# Patient Record
(demographics unavailable — no encounter records)

---

## 2024-11-28 NOTE — CARDIOLOGY SUMMARY
[de-identified] : (10/30/2024) EKG: NSR at 70 beats minute with a frontal QRS axis of +15 degrees. Inferior wall MI of indeterminate age, left IVCD  [de-identified] :  (7/16/2024) NUCLEAR STRESS TEST, 1. Myocardial Perfusion: Abnormal. 2. Stress electrocardiogram: No significant ischemic ST segment changes. 3. Qualitative Perfusion: - medium-sized, severe defect(s) in the inferolateral and lateral walls that are fixed, doesn't normalize with prone imaging suggestive of an infarct. 4. The post stress left ventricular EF is 70 %. 5. Akinesis of the inferolateral and lateral walls. [de-identified] : (ECHOCARDIOGRAPHIC CONCLUSIONS: (11/26//2024) ECHOCARDIOGRAPHIC CONCLUSIONS 1. Technically difficult image quality.   2. Left ventricular systolic function is normal with an ejection fraction visually estimated at 65 %. 3. Normal left ventricular diastolic function. 4. Mild aortic stenosis. Peak velocity 2.17 m/s, mean gradient 8.6 mmHg, VTI ratio 0.62, VICKI 1.82 cm by continuity equation. Gradients may be underestimated due to TDS. Trace aortic regurgitation. 5. Mild mitral valve stenosis. 6. There is calcification of the mitral valve annulus. 7. Moderate mitral regurgitation. Normal pulmonary venous flow. 8. No tricuspid regurgitation. Pulmonary artery systolic pressure could not be estimated. 9. No pericardial effusion seen. 10. Compared to the transthoracic echocardiogram performed on 9/22/2023,.    (9/24/2023) ECHOCARDIOGRAPHIC CONCLUSIONS:  1. Left ventricular systolic function is hyperdynamic with an ejection fraction of 57 % by Pressley's method of disks. 2. Basal inferior segment is abnormal. 3. There is moderate (grade 2) left ventricular diastolic dysfunction. 4. Right ventricular cavity is normal in size and probably normal systolic function. 5. There is moderate calcification of the mitral valve annulus. 6. There is mild mitral valve stenosis. 7. Mild to moderate mitral regurgitation. 8. The left atrium is mildly dilated in size. 9. Mild aortic stenosis. 10. No pericardial effusion seen. 11. Estimated pulmonary artery systolic pressure is 49 mmHg, consistent with mild to moderate pulmonary hypertension. 12. Mild pulmonic regurgitation. 13. Lipomatous interatrial septal hypertrophy present. 14. Echo-free space is noted in the liver consistent with cyst, however, dedicated imaging recommended for further evaluation if clinically indicated. 15. No prior echocardiogram is available for comparison. [de-identified] : (11/26/2024) ANKLE BRACHIAL INDEX CONCLUSIONS:  1. Right: Mildly abnormal TAYLOR 0.73 and ankle waveforms. Slight decrease post exercise. TBI is moderately reduced 0.36. 2. Left: Mildly abnormal TAYLOR 0.73 and ankle waveforms. Slight decrease post exercise. TBI is moderately reduced 0.43. 3. Compared to the TAYLOR report performed on 12/5/2023, decrease in left TAYLOR and bilateral TBI. 4. Consider lower arterial ultrasound for further characterization if clinically indicated.   (11/26/2024) CAROTID DUPLEX CONCLUSIONS:  1. Moderate heterogeneous and calcified non-obstructive atherosclerosis seen bilaterally. 2. Right mid ICA segment is very tortuous. 3. Left Vertebral Artery: antegrade flow. elevated PSV noted (133.40 cm/s). 4. Vertebral arteries: antegrade flow bilaterally. 5. Right: proximal ECA elevated velocity suggestive of narowing. 6. Compared to the carotid report performed on 9/22/2023, elevated left vert PSV, increased disease noted.

## 2024-11-28 NOTE — REASON FOR VISIT
[CV Risk Factors and Non-Cardiac Disease] : CV risk factors and non-cardiac disease [Structural Heart and Valve Disease] : structural heart and valve disease [Hyperlipidemia] : hyperlipidemia [Hypertension] : hypertension [Coronary Artery Disease] : coronary artery disease [FreeTextEntry1] : Patient is a 77-year-old white female with advanced ischemic and peripheral vascular disease who presents to the office today to review her duplex scan of her carotid arteries, and updated echocardiogram to evaluate LV function as well as to review arterial Dopplers of the lower extremity and TAYLOR index to evaluate the status of her advanced ischemic peripheral vas disease.  Patient was last here at the end of October for routine interval follow-up and will follow-up again in January for routine visit.  When she was here in July was 2 to review her nuclear stress test which was recommended at part of her routine follow-up.  She had presented in the summer  with a new complaint of difficulty breathing, she was here 2 months prior to that and notedi increasing fatigue and poor sleep patterns. Her Lexiscan stress test was without evidence of provokable ischemia with a preserved ejection fraction of 70% (see cardiology summary below)  Patient presented at the end of October depressed over her daughters situation as she has advanced liver disease but otherwise expresses no new or active cardiac concerns or complaints.  She notes she has been exercising a lot less but has no symptoms with routine activities, she is taking and tolerating all of her medications and otherwise has no other complaints at this time  At the time of her last evaluation for increased dyspnea a diuretic was implemented on alternate day, she was previously seen in the  office  to evaluate her response to the addition of diuretics based on elevated pulmonary pressures at the time of last echo.  Patient presented on that occasion noting that she was feeling significantly better with no new or active cardiac symptoms since initiating the extra diuretic..  She presents today with new complaints of more dyspnea with minimal exertion and is curtailed to her activity.  She presents today to review her noninvasive diagnostic studies feeling generally well with no new or active concerns or complaints of angina, claudication with exertion, PND orthopnea.

## 2024-11-28 NOTE — HISTORY OF PRESENT ILLNESS
[FreeTextEntry1] : Patient is a 77-year-old white female with advanced ASCVD, peripheral vascular disease and status post coronary bypass surgery and multiple interventions for revascularization who well-known to me since age 38 and was seen initially on August 3  in the office here to establish her ongoing cardiac care here and presents to the office several weeks ago for a brief follow-up, blood pressure check and as well as to review a battery of cardiac and vascular testing scheduled at the time of her last visit. Before today's testing she had a Lexiscan study performed August 29 that revealed a preserved ejection fraction and no evidence of active ischemia.  She underwent echocardiography on September 22 which is outlined below in cardiac summary and noted an elevated pulmonary pressure to 49 not previously recorded as that high.  Patient's prior cardiac history is notable for presenting at age 36 with new onset angina ,diagnostic cardiac cath was notable for left main disease and she underwent a quadruple bypass at age 38 with Dr. Justin Good at [facility].. She has been followed since that time with stress testing on a regular basis she unfortunately had a premorbid history of smoking 3 packs/day accounting for her aggressive vascular disease at a young age.. Patient has had other significant medical and cardiovascular issues she developed progressive claudication and underwent right popliteal stenosis repair with Dr. Corbett at [facility] for severe SFA stenosis. She has had vascular work done by Dr. Echols at [place] for recurrent and progressive symptomatic peripheral vascular disease. She is status post abdominal hernia repair with Dr. Webb within the last 2 years and is status post a right total hip replacement April 21 with Dr. Gordon Garces.   She is status post recent PTCA and stenting to circumflex lesion that was found on routine Lexiscan stress test in February 2022. At the time of that stress test there was mild reversible ischemia in a larger the circumflex and a right coronary with an EF of 43%. She underwent cardiac cath and was found to have circumflex disease and was stented at that time by Dr. Olman Wheatley..  When she presented here in August she was without any angina. She could and continues to walk 1 mile with 1 break. At a half a mile she notes no overt claudication but a heaviness in her legs that is new compared to a year ago. She is chronically on Trental and a host of other medications including aspirin nitrates beta-blockers antihypertensives as well as statins and Effient and aspirin..  At the time of her last visit she presented presents for her final study of arterial Dopplers of her lower extremities but ABIs were not able to be done.  They had been done subsequently and she presents today to review these in conjunction with her other studies as well as a brief follow-up to see her response to the addition of diuretics for elevated pulmonary pressures.  She seems to be doing well, denies PND orthopnea, trace lower extremity edema related to previous vascular surgery, no PND orthopnea and her exercise tolerance is slightly improved since being on the diuretic with less dyspnea noted.

## 2024-12-14 NOTE — HISTORY OF PRESENT ILLNESS
[FreeTextEntry1] : Patient is a 77-year-old white female with advanced ASCVD, peripheral vascular disease and status post coronary bypass surgery and multiple interventions for revascularization who well-known to me since age 38 and was seen initially on August 3 2023 in the office here to establish her ongoing cardiac care here and presents to the office several weeks ago for a brief follow-up, blood pressure check and as well as to review a battery of cardiac and vascular testing scheduled at the time of her last visit. Before today's testing she had a Lexiscan study performed August 29 that revealed a preserved ejection fraction and no evidence of active ischemia.  She underwent echocardiography on September 22 which is outlined below in cardiac summary and noted an elevated pulmonary pressure to 49 not previously recorded as that high.  Patient's prior cardiac history is notable for presenting at age 36 with new onset angina ,diagnostic cardiac cath was notable for left main disease and she underwent a quadruple bypass at age 38 with Dr. Justin Good at [facility].. She has been followed since that time with stress testing on a regular basis she unfortunately had a premorbid history of smoking 3 packs/day accounting for her aggressive vascular disease at a young age.. Patient has had other significant medical and cardiovascular issues she developed progressive claudication and underwent right popliteal stenosis repair with Dr. Corbett at [facility] for severe SFA stenosis. She has had vascular work done by Dr. Echols at [place] for recurrent and progressive symptomatic peripheral vascular disease. She is status post abdominal hernia repair with Dr. Webb within the last 2 years and is status post a right total hip replacement April 21 with Dr. Gordon Garces.   She is status post recent PTCA and stenting to circumflex lesion that was found on routine Lexiscan stress test in February 2022. At the time of that stress test there was mild reversible ischemia in a larger the circumflex and a right coronary with an EF of 43%. She underwent cardiac cath and was found to have circumflex disease and was stented at that time by Dr. Olman Wheatley..  When she presented here in August she was without any angina. She could and continues to walk 1 mile with 1 break. At a half a mile she notes no overt claudication but a heaviness in her legs that is new compared to a year ago. She is chronically on Trental and a host of other medications including aspirin nitrates beta-blockers antihypertensives as well as statins and Effient and aspirin..  At the time of her last visit she presented presents for her final study of arterial Dopplers of her lower extremities but ABIs were not able to be done.  They had been done subsequently and they were quite stable, she has no claudication with routine activities in addition she has had her echo updated which revealed stable LV function and mild valvular heart disease.  (See cardiology summary).  Patient had this recent episode of near syncope related to postural orthostatic changes and is aware that she is chronically dehydrated and that her current medication POTS or vascular response of this.  Her vital signs are ideal today at 132/60 and she was once again encouraged to hydrate no other changes were made to her medical regimen at this point in time other than limiting use of her diuretic and antiplatelet therapy has been DC'd until she has neurological clearance to reimplemented.  She will be seeing neurologist next week..

## 2024-12-14 NOTE — REVIEW OF SYSTEMS
[Dyspnea on exertion] : dyspnea during exertion [Dizziness] : dizziness [Negative] : Heme/Lymph [SOB] : no shortness of breath [Chest Discomfort] : no chest discomfort [Lower Ext Edema] : no extremity edema [Leg Claudication] : no intermittent leg claudication [Palpitations] : no palpitations [Orthopnea] : no orthopnea [PND] : no PND [Syncope] : no syncope [de-identified] : Near syncope

## 2024-12-14 NOTE — CARDIOLOGY SUMMARY
[de-identified] : (12/12/2024) EKG: NSR at 74 beats minute with a frontal QRS axis +15 degrees, inferior MI indeterminate age, nonspecific IVCD otherwise normal trace [de-identified] :  (7/16/2024) NUCLEAR STRESS TEST, 1. Myocardial Perfusion: Abnormal. 2. Stress electrocardiogram: No significant ischemic ST segment changes. 3. Qualitative Perfusion: - medium-sized, severe defect(s) in the inferolateral and lateral walls that are fixed, doesn't normalize with prone imaging suggestive of an infarct. 4. The post stress left ventricular EF is 70 %. 5. Akinesis of the inferolateral and lateral walls.  [de-identified] : (11/26//2024) ECHOCARDIOGRAPHIC CONCLUSIONS 1. Technically difficult image quality. 2. Left ventricular systolic function is normal with an ejection fraction visually estimated at 65 %. 3. Normal left ventricular diastolic function. 4. Mild aortic stenosis. Peak velocity 2.17 m/s, mean gradient 8.6 mmHg, VTI ratio 0.62, VICKI 1.82 cm by continuity equation. Gradients may be underestimated due to TDS. Trace aortic regurgitation. 5. Mild mitral valve stenosis. 6. There is calcification of the mitral valve annulus. 7. Moderate mitral regurgitation. Normal pulmonary venous flow. 8. No tricuspid regurgitation. Pulmonary artery systolic pressure could not be estimated. 9. No pericardial effusion seen. 10. Compared to the transthoracic echocardiogram performed on 9/22/2023,.    (9/24/2023) ECHOCARDIOGRAPHIC CONCLUSIONS:  1. Left ventricular systolic function is hyperdynamic with an ejection fraction of 57 % by Pressley's method of disks. 2. Basal inferior segment is abnormal. 3. There is moderate (grade 2) left ventricular diastolic dysfunction. 4. Right ventricular cavity is normal in size and probably normal systolic function. 5. There is moderate calcification of the mitral valve annulus. 6. There is mild mitral valve stenosis. 7. Mild to moderate mitral regurgitation. 8. The left atrium is mildly dilated in size. 9. Mild aortic stenosis. 10. No pericardial effusion seen. 11. Estimated pulmonary artery systolic pressure is 49 mmHg, consistent with mild to moderate pulmonary hypertension. 12. Mild pulmonic regurgitation. 13. Lipomatous interatrial septal hypertrophy present. 14. Echo-free space is noted in the liver consistent with cyst, however, dedicated imaging recommended for further evaluation if clinically indicated. 15. No prior echocardiogram is available for comparison.   [de-identified] :   (11/26/2024) CAROTID DUPLEX CONCLUSIONS:  1. Moderate heterogeneous and calcified non-obstructive atherosclerosis seen bilaterally. 2. Right mid ICA segment is very tortuous. 3. Left Vertebral Artery: antegrade flow. elevated PSV noted (133.40 cm/s). 4. Vertebral arteries: antegrade flow bilaterally. 5. Right: proximal ECA elevated velocity suggestive of narowing. 6. Compared to the carotid report performed on 9/22/2023, elevated left vert PSV, increased disease noted.   (11/26/2024) ANKLE BRACHIAL INDEX CONCLUSIONS:  1. Right: Mildly abnormal TAYLOR 0.73 and ankle waveforms. Slight decrease post exercise. TBI is moderately reduced 0.36. 2. Left: Mildly abnormal TAYLOR 0.73 and ankle waveforms. Slight decrease post exercise. TBI is moderately reduced 0.43. 3. Compared to the TAYLOR report performed on 12/5/2023, decrease in left TAYLOR and bilateral TBI. 4. Consider lower arterial ultrasound for further characterization if clinically indicated.

## 2024-12-14 NOTE — REVIEW OF SYSTEMS
[Dyspnea on exertion] : dyspnea during exertion [Dizziness] : dizziness [Negative] : Heme/Lymph [SOB] : no shortness of breath [Chest Discomfort] : no chest discomfort [Lower Ext Edema] : no extremity edema [Leg Claudication] : no intermittent leg claudication [Palpitations] : no palpitations [Orthopnea] : no orthopnea [PND] : no PND [Syncope] : no syncope [de-identified] : Near syncope

## 2024-12-14 NOTE — CARDIOLOGY SUMMARY
[de-identified] : (12/12/2024) EKG: NSR at 74 beats minute with a frontal QRS axis +15 degrees, inferior MI indeterminate age, nonspecific IVCD otherwise normal trace [de-identified] :  (7/16/2024) NUCLEAR STRESS TEST, 1. Myocardial Perfusion: Abnormal. 2. Stress electrocardiogram: No significant ischemic ST segment changes. 3. Qualitative Perfusion: - medium-sized, severe defect(s) in the inferolateral and lateral walls that are fixed, doesn't normalize with prone imaging suggestive of an infarct. 4. The post stress left ventricular EF is 70 %. 5. Akinesis of the inferolateral and lateral walls.  [de-identified] : (11/26//2024) ECHOCARDIOGRAPHIC CONCLUSIONS 1. Technically difficult image quality. 2. Left ventricular systolic function is normal with an ejection fraction visually estimated at 65 %. 3. Normal left ventricular diastolic function. 4. Mild aortic stenosis. Peak velocity 2.17 m/s, mean gradient 8.6 mmHg, VTI ratio 0.62, VICKI 1.82 cm by continuity equation. Gradients may be underestimated due to TDS. Trace aortic regurgitation. 5. Mild mitral valve stenosis. 6. There is calcification of the mitral valve annulus. 7. Moderate mitral regurgitation. Normal pulmonary venous flow. 8. No tricuspid regurgitation. Pulmonary artery systolic pressure could not be estimated. 9. No pericardial effusion seen. 10. Compared to the transthoracic echocardiogram performed on 9/22/2023,.    (9/24/2023) ECHOCARDIOGRAPHIC CONCLUSIONS:  1. Left ventricular systolic function is hyperdynamic with an ejection fraction of 57 % by Pressley's method of disks. 2. Basal inferior segment is abnormal. 3. There is moderate (grade 2) left ventricular diastolic dysfunction. 4. Right ventricular cavity is normal in size and probably normal systolic function. 5. There is moderate calcification of the mitral valve annulus. 6. There is mild mitral valve stenosis. 7. Mild to moderate mitral regurgitation. 8. The left atrium is mildly dilated in size. 9. Mild aortic stenosis. 10. No pericardial effusion seen. 11. Estimated pulmonary artery systolic pressure is 49 mmHg, consistent with mild to moderate pulmonary hypertension. 12. Mild pulmonic regurgitation. 13. Lipomatous interatrial septal hypertrophy present. 14. Echo-free space is noted in the liver consistent with cyst, however, dedicated imaging recommended for further evaluation if clinically indicated. 15. No prior echocardiogram is available for comparison.   [de-identified] :   (11/26/2024) CAROTID DUPLEX CONCLUSIONS:  1. Moderate heterogeneous and calcified non-obstructive atherosclerosis seen bilaterally. 2. Right mid ICA segment is very tortuous. 3. Left Vertebral Artery: antegrade flow. elevated PSV noted (133.40 cm/s). 4. Vertebral arteries: antegrade flow bilaterally. 5. Right: proximal ECA elevated velocity suggestive of narowing. 6. Compared to the carotid report performed on 9/22/2023, elevated left vert PSV, increased disease noted.   (11/26/2024) ANKLE BRACHIAL INDEX CONCLUSIONS:  1. Right: Mildly abnormal TAYLOR 0.73 and ankle waveforms. Slight decrease post exercise. TBI is moderately reduced 0.36. 2. Left: Mildly abnormal TAYLOR 0.73 and ankle waveforms. Slight decrease post exercise. TBI is moderately reduced 0.43. 3. Compared to the TAYLOR report performed on 12/5/2023, decrease in left TAYLOR and bilateral TBI. 4. Consider lower arterial ultrasound for further characterization if clinically indicated.

## 2024-12-14 NOTE — REASON FOR VISIT
[CV Risk Factors and Non-Cardiac Disease] : CV risk factors and non-cardiac disease [Structural Heart and Valve Disease] : structural heart and valve disease [Hyperlipidemia] : hyperlipidemia [Hypertension] : hypertension [Coronary Artery Disease] : coronary artery disease [Spouse] : spouse [FreeTextEntry1] : Patient is a 77-year-old white female with advanced ischemic heart and peripheral vascular disease who presents to the office today after a brief hospitalization for syncope related to postural changes after 2 alcoholic beverages.  Patient previously had been seen in the office just recently on November 27 to review her duplex scan of her carotid arteries, and updated echocardiogram to evaluate LV function as well as to review arterial Dopplers of the lower extremity and TAYLOR index to evaluate the status of her advanced ischemic peripheral vas disease..Prior to her testing today she was seen here at the end of October for routine interval follow-up and will follow-up again in January for routine visit.  When she was here in July was 2 to review her nuclear stress test which was recommended at part of her routine follow-up.  She had presented in the summer  with a new complaint of difficulty breathing, she was here 2 months prior to that and notedi increasing fatigue and poor sleep patterns. Her Lexiscan stress test was without evidence of provokable ischemia with a preserved ejection fraction of 70% (see cardiology summary below)  Patient presented at the end of October depressed over her daughters situation as she has advanced liver disease but otherwise expresses no new or active cardiac concerns or complaints.  She notes she has been exercising a lot less but has no symptoms with routine activities, she is taking and tolerating all of her medications and otherwise has no other complaints at this time  At the time of her last evaluation for increased dyspnea a diuretic was implemented on alternate day, she was previously seen in the  office  to evaluate her response to the addition of diuretics based on elevated pulmonary pressures at the time of last echo.  Patient presented on that occasion noting that she was feeling significantly better with no new or active cardiac symptoms since initiating the extra diuretic..  She presents today with 2 episodes of near syncope the initial 1 associated with getting up quickly after having had 2 alcoholic beverages.  She did hit her head and had a very mild traumatic subdural.  She was taken to [facility] antiplatelet therapy was held and she presents today for reevaluation with no ongoing headache or other neurological complaints.  She admits she is not hydrating well, her BUN most recent lab work from December 10 was notable for a BUN of 9.9 and a creatinine of 0.49.  proBNP was 647 and she admits to not drinking well.  She no longer has symptoms of dyspnea with minimal exertion and hoping to become more active.

## 2024-12-14 NOTE — HISTORY OF PRESENT ILLNESS
[FreeTextEntry1] : Patient is a 77-year-old white female with advanced ASCVD, peripheral vascular disease and status post coronary bypass surgery and multiple interventions for revascularization who well-known to me since age 38 and was seen initially on August 3 2023 in the office here to establish her ongoing cardiac care here and presents to the office several weeks ago for a brief follow-up, blood pressure check and as well as to review a battery of cardiac and vascular testing scheduled at the time of her last visit. Before today's testing she had a Lexiscan study performed August 29 that revealed a preserved ejection fraction and no evidence of active ischemia.  She underwent echocardiography on September 22 which is outlined below in cardiac summary and noted an elevated pulmonary pressure to 49 not previously recorded as that high.  Patient's prior cardiac history is notable for presenting at age 36 with new onset angina ,diagnostic cardiac cath was notable for left main disease and she underwent a quadruple bypass at age 38 with Dr. Justin Good at [facility].. She has been followed since that time with stress testing on a regular basis she unfortunately had a premorbid history of smoking 3 packs/day accounting for her aggressive vascular disease at a young age.. Patient has had other significant medical and cardiovascular issues she developed progressive claudication and underwent right popliteal stenosis repair with Dr. Corbett at [facility] for severe SFA stenosis. She has had vascular work done by Dr. Echols at [place] for recurrent and progressive symptomatic peripheral vascular disease. She is status post abdominal hernia repair with Dr. Webb within the last 2 years and is status post a right total hip replacement April 21 with Dr. Gordon Garces.   She is status post recent PTCA and stenting to circumflex lesion that was found on routine Lexiscan stress test in February 2022. At the time of that stress test there was mild reversible ischemia in a larger the circumflex and a right coronary with an EF of 43%. She underwent cardiac cath and was found to have circumflex disease and was stented at that time by Dr. Olman Wheatley..  When she presented here in August she was without any angina. She could and continues to walk 1 mile with 1 break. At a half a mile she notes no overt claudication but a heaviness in her legs that is new compared to a year ago. She is chronically on Trental and a host of other medications including aspirin nitrates beta-blockers antihypertensives as well as statins and Effient and aspirin..  At the time of her last visit she presented presents for her final study of arterial Dopplers of her lower extremities but ABIs were not able to be done.  They had been done subsequently and they were quite stable, she has no claudication with routine activities in addition she has had her echo updated which revealed stable LV function and mild valvular heart disease.  (See cardiology summary).  Patient had this recent episode of near syncope related to postural orthostatic changes and is aware that she is chronically dehydrated and that her current medication POTS or vascular response of this.  Her vital signs are ideal today at 132/60 and she was once again encouraged to hydrate no other changes were made to her medical regimen at this point in time other than limiting use of her diuretic and antiplatelet therapy has been DC'd until she has neurological clearance to reimplemented.  She will be seeing neurologist next week..

## 2024-12-14 NOTE — PHYSICAL EXAM
[Normal] : normal conjunctiva [Carotid Bruit] : carotid bruit [Normal S1, S2] : normal S1, S2 [No Gallop] : no gallop [Murmur] : murmur [Diminished Pedal Pulses ___] : diminished pedal pulses [unfilled] [de-identified] : Bilateral right greater than left [de-identified] : Grade 1/6 to 2/6 apical MR murmur

## 2024-12-14 NOTE — DISCUSSION/SUMMARY
[EKG obtained to assist in diagnosis and management of assessed problem(s)] : EKG obtained to assist in diagnosis and management of assessed problem(s) [FreeTextEntry1] : Patient is a 77-year-old white female who presents the office today for evaluation after 2 near syncopal episodes associated with orthostatic changes, no prodrome, no loss of consciousness but wound up in [facility] ER with a traumatic subdural and antiplatelet agents had to be discontinued.  Patient is well-known to me has  advanced ischemic and peripheral vascular disease,, status post quadruple bypass at age 38 status post multiple interventions last being in 2022 to a  circumflex lesion who presents to the office today review multiple vascular studies and she is now on mild aortic stenosis which has remained mild and her ejection fraction is preserved at 65%.  She has had previous carotid artery disease that also looks stable on recent study and her lower extremity Dopplers with TAYLOR indexes reveal quite satisfactory and disease indices of 0.73 bilaterally with only a distal toe being potentially Rise but she is relatively asymptomatic.  Patient was reassured regarding her vascular studies at the time of last visit.  She has preserved ejection fraction, her valvular heart disease is stable and her vascular disease has not progressed either objectively or clinically.  With regard to issues that led up to her recent admission.  She was cautioned to continue to hydrate well, judiciously use her diuretic as her proBNP is minimally elevated and it is warranted she is less dyspneic on it but needs to hydrate better otherwise no changes were made to her medical regimen at this point in time.  She was advised to return to the office in approximately 6 to 8 weeks for her routine interval follow-up.  (See all studies in cardiology summary)  Joel Goldberg, MD, FACC

## 2024-12-14 NOTE — PHYSICAL EXAM
[Normal] : normal conjunctiva [Carotid Bruit] : carotid bruit [Normal S1, S2] : normal S1, S2 [No Gallop] : no gallop [Murmur] : murmur [Diminished Pedal Pulses ___] : diminished pedal pulses [unfilled] [de-identified] : Bilateral right greater than left [de-identified] : Grade 1/6 to 2/6 apical MR murmur

## 2024-12-14 NOTE — DISCUSSION/SUMMARY
[EKG obtained to assist in diagnosis and management of assessed problem(s)] : EKG obtained to assist in diagnosis and management of assessed problem(s) [FreeTextEntry1] : Patient is a 77-year-old white female who presents the office today for evaluation after 2 near syncopal episodes associated with orthostatic changes, no prodrome, no loss of consciousness but wound up in [facility] ER with a traumatic subdural and antiplatelet agents had to be discontinued.  Patient is well-known to me has  advanced ischemic and peripheral vascular disease,, status post quadruple bypass at age 38 status post multiple interventions last being in 2022 to a  circumflex lesion who presents to the office today review multiple vascular studies and she is now on mild aortic stenosis which has remained mild and her ejection fraction is preserved at 65%.  She has had previous carotid artery disease that also looks stable on recent study and her lower extremity Dopplers with TAYLOR indexes reveal quite satisfactory and disease indices of 0.73 bilaterally with only a distal toe being potentially Rise but she is relatively asymptomatic.  Patient was reassured regarding her vascular studies at the time of last visit.  She has preserved ejection fraction, her valvular heart disease is stable and her vascular disease has not progressed either objectively or clinically.  With regard to issues that led up to her recent admission.  She was cautioned to continue to hydrate well, judiciously use her diuretic as her proBNP is minimally elevated and it is warranted she is less dyspneic on it but needs to hydrate better otherwise no changes were made to her medical regimen at this point in time.  She was advised to return to the office in approximately 6 to 8 weeks for her routine interval follow-up.  (See all studies in cardiology summary)  Joel Goldberg, MD, FACC

## 2024-12-14 NOTE — REASON FOR VISIT
[CV Risk Factors and Non-Cardiac Disease] : CV risk factors and non-cardiac disease [Structural Heart and Valve Disease] : structural heart and valve disease [Hyperlipidemia] : hyperlipidemia [Hypertension] : hypertension [Coronary Artery Disease] : coronary artery disease [Spouse] : spouse [FreeTextEntry1] : Patient is a 77-year-old white female with advanced ischemic heart and peripheral vascular disease who presents to the office today after a brief hospitalization for syncope related to postural changes after 2 alcoholic beverages.  Patient previously had been seen in the office just recently on November 27 to review her duplex scan of her carotid arteries, and updated echocardiogram to evaluate LV function as well as to review arterial Dopplers of the lower extremity and TAYLOR index to evaluate the status of her advanced ischemic peripheral vas disease..Prior to her testing today she was seen here at the end of October for routine interval follow-up and will follow-up again in January for routine visit.  When she was here in July was 2 to review her nuclear stress test which was recommended at part of her routine follow-up.  She had presented in the summer  with a new complaint of difficulty breathing, she was here 2 months prior to that and notedi increasing fatigue and poor sleep patterns. Her Lexiscan stress test was without evidence of provokable ischemia with a preserved ejection fraction of 70% (see cardiology summary below)  Patient presented at the end of October depressed over her daughters situation as she has advanced liver disease but otherwise expresses no new or active cardiac concerns or complaints.  She notes she has been exercising a lot less but has no symptoms with routine activities, she is taking and tolerating all of her medications and otherwise has no other complaints at this time  At the time of her last evaluation for increased dyspnea a diuretic was implemented on alternate day, she was previously seen in the  office  to evaluate her response to the addition of diuretics based on elevated pulmonary pressures at the time of last echo.  Patient presented on that occasion noting that she was feeling significantly better with no new or active cardiac symptoms since initiating the extra diuretic..  She presents today with 2 episodes of near syncope the initial 1 associated with getting up quickly after having had 2 alcoholic beverages.  She did hit her head and had a very mild traumatic subdural.  She was taken to [facility] antiplatelet therapy was held and she presents today for reevaluation with no ongoing headache or other neurological complaints.  She admits she is not hydrating well, her BUN most recent lab work from December 10 was notable for a BUN of 9.9 and a creatinine of 0.49.  proBNP was 647 and she admits to not drinking well.  She no longer has symptoms of dyspnea with minimal exertion and hoping to become more active.

## 2024-12-17 NOTE — PHYSICAL EXAM
[Alert] : alert [Well Nourished] : well nourished [No Acute Distress] : no acute distress [Well Developed] : well developed [Normal Sclera/Conjunctiva] : normal sclera/conjunctiva [No Proptosis] : no proptosis [Thyroid Not Enlarged] : the thyroid was not enlarged [No Thyroid Nodules] : no palpable thyroid nodules [Clear to Auscultation] : lungs were clear to auscultation bilaterally [Normal S1, S2] : normal S1 and S2 [Normal Rate] : heart rate was normal [Regular Rhythm] : with a regular rhythm [Not Tender] : non-tender [Not Distended] : not distended [Soft] : abdomen soft [No Spinal Tenderness] : no spinal tenderness [Spine Straight] : spine straight [No Stigmata of Cushings Syndrome] : no stigmata of Cushings Syndrome [Normal Gait] : normal gait [No Tremors] : no tremors [Oriented x3] : oriented to person, place, and time [Normal Affect] : the affect was normal

## 2024-12-19 NOTE — HISTORY OF PRESENT ILLNESS
[Alendronate (Fosomax)] : Alendronate [Denosumab (Prolia)] : Denosumab [FreeTextEntry1] : Pt returns for a follow-up visit for osteoporosis. Pt has been on Prolia 09/2015.  Pt had a fall 12/05/24 and hit her head. Pt had a CT scan. Pt was hospitalized for a couple of days due to subdural hematoma. Pt had another fall after having a syncopal episode and went to [facility] where she had CT scans done.   Pt has been told of decreasing bone density for several years. She took Fosamax for a short time in the distant past approximately 15 years ago but did not tolerate it due to UGI sx. She got a wrist fx several years ago due to a minor fall. No unusual risk factors for osteoporosis. On Prolia since 09/2015. Tolerating well. BMD 09/2016 indicates stable osteopenia in the spine, improved osteopenia in hips, stable osteoporosis in prox. radius. BMD results reviewed w/pt. BMD 10/2018 indicated improved osteopenia in the spine, stable osteopenia in hips, improved osteoporosis in proximal radius. Hips is significantly improved vs. 2015.  BMD results reviewed w/pt. BMD 11/2020 appears increased in spine but I suspect this is artifact due to arthritis. Femoral neck bone density moderately increased. Proximal radius remains severely low but stable.  BMD results reviewed w/pt. BMD 11/2022 indicates normal spine although suspect some arthritis, stable osteopenia in total hip improved osteopenia in fem neck and stable osteoporosis in prox. radius.  BMD results reviewed w/pt. BMD 12/2024 indicates improved normal spine, stable osteopenia in hips, stable osteoporosis in prox. radius.  BMD results reviewed w/pt.    Hypothyroidism: clinically euthyroid on levothyroxine 75 mcg daily. No sx of hyper or hypothyroidism. No local neck pain. No dysphagia or dysphonia. No raciness, shakiness, heat/cold intolerance, tiredness, or fatigue. No palpitations, tremors, or sudden weight gain/loss.  Pt had a fall and had a left distal radius fracture. Pt had surgery in May 2023 and had a three- hole trimed volar plate implanted. Surgery was done at [facility]. Pt goes to physical therapy for balance and states it helps. Pt has a small vesicular rash on buttock for a week now. Went to a dermatologist and had a biopsy done. Results came back normal.    Previously had angina, several stents placed, R leg clot - had procedure [facility]. Pt still occ gets CP seemingly unrelated to what she is doing. H/o 2 vasc surgery procedures R femoral artery,claudication resolved.  Pt had R THR 4/2021, no complications. Had 20 lb weight loss s/p THR.

## 2024-12-19 NOTE — PROCEDURE
[FreeTextEntry1] :  Bone Mineral Density: 12/17/2024 Indication: Comparison to 2022, assess response to medication Spine: L1-3, -0.7, normal, +2.9%, +21% vs baseline  Total hip: -2.1, osteopenia, no significant change Femoral neck:  -1.7, osteopenia, no significant change Proximal radius: -3.7, osteoporosis, no significant change  Bone mineral density November 14, 2022 Compared to 2020 Spine excluding L4 -0.9 normal no significant change suspect some arthritis Total hip -2.2 osteopenia no significant change, +10.4% versus baseline 2015 Femoral neck -1.7 osteopenia +4.6%, +21.6% versus 2015 Proximal radius -3.7 osteoporosis no significant change  Bone mineral density November 17, 2020 indication: Compared to 2018 spine L1-3 -0.9 normal +13.2% suspect arthritic total hip -2.3 osteopenia no significant change femoral neck -2.0 osteopenia +5.5% proximal radius -3.8 osteoporosis no significant change  Bone mineral density: 10/16/2018  Indication: vs. 2016 Spine: -1.9 osteopenia (+4.6%) Total hip: -2.3 osteopenia, no significant change (+9.3% vs. 2015) Femoral neck: -2.3 osteopenia, no significant change (+10.2% vs. 2015) Proximal radius: -3.5 osteoporosis (+3.9%)  BMD 9/26/16 indication: assess response to Prolia Spine -2.3, osteopenia although some arthritis Total hip - 2.4, osteopenia +6.2% Femoral neck -2.3 osteopenia +8.5% Proximal radius -3.8, osteoporosis, no prior

## 2024-12-19 NOTE — ASSESSMENT
[FreeTextEntry1] : 76 y/o female returns for a follow-up visit for osteoporosis and hypothyroidism.   1. Postmenopausal osteoporosis: tried Fosamax in the distant past and did not tolerate due to UGI sx. Pt had a wrist fx no other osteoporosis related fx. On Prolia since 09/2015. Tolerating well. t. BMD 12/2024 indicates improved normal spine, stable osteopenia in hips, stable osteoporosis in prox. radius.  BMD results reviewed w/pt. Continue Prolia, buy and bill.  2. Hypothyroidism: clinically and chemically euthyroid on Synthroid 75 mcg daily. No local neck pain. No dysphagia or dysphonia. No raciness, shakiness, heat/cold intolerance, tiredness, or fatigue. No palpitations, tremors, or sudden weight gain/loss.  Labs: October 2024 Creatinine: 0.67 Calcium: 10.0 TSH: 1.51 Total T4: 11.7  F/u in 6 months.

## 2024-12-19 NOTE — END OF VISIT
[FreeTextEntry3] :  This note was written by Corine Wolf on (December 17, 2024) acting as a medical scribe for Dr. Katz This note was authored by the medical scribe for me. I have reviewed, edited, and revised the note as needed. I am in agreement with the exam findings, imaging findings, and treatment plan.  Cirilo Katz MD

## 2024-12-19 NOTE — HISTORY OF PRESENT ILLNESS
[Alendronate (Fosomax)] : Alendronate [Denosumab (Prolia)] : Denosumab [FreeTextEntry1] : Pt returns for a follow-up visit for osteoporosis. Pt has been on Prolia 09/2015.  Pt had a fall 12/05/24 and hit her head. Pt had a CT scan. Pt was hospitalized for a couple of days due to subdural hematoma. Pt had another fall after having a syncopal episode and went to [facility] where she had CT scans done.   Pt has been told of decreasing bone density for several years. She took Fosamax for a short time in the distant past approximately 15 years ago but did not tolerate it due to UGI sx. She got a wrist fx several years ago due to a minor fall. No unusual risk factors for osteoporosis. On Prolia since 09/2015. Tolerating well. BMD 09/2016 indicates stable osteopenia in the spine, improved osteopenia in hips, stable osteoporosis in prox. radius. BMD results reviewed w/pt. BMD 10/2018 indicated improved osteopenia in the spine, stable osteopenia in hips, improved osteoporosis in proximal radius. Hips is significantly improved vs. 2015.  BMD results reviewed w/pt. BMD 11/2020 appears increased in spine but I suspect this is artifact due to arthritis. Femoral neck bone density moderately increased. Proximal radius remains severely low but stable.  BMD results reviewed w/pt. BMD 11/2022 indicates normal spine although suspect some arthritis, stable osteopenia in total hip improved osteopenia in fem neck and stable osteoporosis in prox. radius.  BMD results reviewed w/pt. BMD 12/2024 indicates improved normal spine, stable osteopenia in hips, stable osteoporosis in prox. radius.  BMD results reviewed w/pt.    Hypothyroidism: clinically euthyroid on levothyroxine 75 mcg daily. No sx of hyper or hypothyroidism. No local neck pain. No dysphagia or dysphonia. No raciness, shakiness, heat/cold intolerance, tiredness, or fatigue. No palpitations, tremors, or sudden weight gain/loss.  Pt had a fall and had a left distal radius fracture. Pt had surgery in May 2023 and had a three- hole trimed volar plate implanted. Surgery was done at [facility]. Pt goes to physical therapy for balance and states it helps. Pt has a small vesicular rash on buttock for a week now. Went to a dermatologist and had a biopsy done. Results came back normal.    Previously had angina, several stents placed, R leg clot - had procedure [facility]. Pt still occ gets CP seemingly unrelated to what she is doing. H/o 2 vasc surgery procedures R femoral artery,claudication resolved.  Pt had R THR 4/2021, no complications. Had 20 lb weight loss s/p THR.

## 2024-12-19 NOTE — REVIEW OF SYSTEMS
[Fatigue] : no fatigue [Decreased Appetite] : appetite not decreased [Neck Pain] : no neck pain [Chest Pain] : no chest pain [Palpitations] : no palpitations [Shortness Of Breath] : no shortness of breath [Nausea] : no nausea [Abdominal Pain] : no abdominal pain [Vomiting] : no vomiting [Diarrhea] : no diarrhea [Polyuria] : no polyuria [Tremors] : no tremors [Cold Intolerance] : no cold intolerance [Heat Intolerance] : no heat intolerance [FreeTextEntry7] : Occasional constipation

## 2025-01-30 NOTE — REVIEW OF SYSTEMS
[Dyspnea on exertion] : dyspnea during exertion [Dizziness] : dizziness [Negative] : Heme/Lymph [SOB] : no shortness of breath [Chest Discomfort] : no chest discomfort [Lower Ext Edema] : no extremity edema [Leg Claudication] : no intermittent leg claudication [Palpitations] : no palpitations [Orthopnea] : no orthopnea [PND] : no PND [Syncope] : no syncope [de-identified] : Near syncope

## 2025-01-30 NOTE — REASON FOR VISIT
[CV Risk Factors and Non-Cardiac Disease] : CV risk factors and non-cardiac disease [Structural Heart and Valve Disease] : structural heart and valve disease [Hyperlipidemia] : hyperlipidemia [Hypertension] : hypertension [Coronary Artery Disease] : coronary artery disease [FreeTextEntry1] : Patient is a 77-year-old white female who presents the office today after having titrated upwards her beta-blocker and attempt to suppress episodic SVT seen on her last 3-day Zio patch  Patient is well-known to the practice, she has with advanced ischemic heart and peripheral vascular disease.  Patient previously had been seen in the office just recently on November 27 to review her duplex scan of her carotid arteries, and updated echocardiogram to evaluate LV function as well as to review arterial Dopplers of the lower extremity and TAYLOR index to evaluate the status of her advanced ischemic peripheral vas disease..  Prior to her testing today she was seen here at the end of October for routine interval follow-up and will follow-up again in January for routine visit.  When she was here in July was 2 to review her nuclear stress test which was recommended at part of her routine follow-up.  She had presented in the summer  with a new complaint of difficulty breathing, she was here 2 months prior to that and noted increasing fatigue and poor sleep patterns. Her Lexiscan stress test was without evidence of provokable ischemia with a preserved ejection fraction of 70% (see cardiology summary below)  Patient presented at the end of October depressed over her daughters situation as she has advanced liver disease but otherwise expresses no new or active cardiac concerns or complaints.  She notes she has been exercising a lot less but has no symptoms with routine activities, she is taking and tolerating all of her medications and otherwise has no other complaints at this time  At the time of her last evaluation for increased dyspnea a diuretic was implemented on alternate day, she was previously seen in the  office  to evaluate her response to the addition of diuretics based on elevated pulmonary pressures at the time of last echo.  Patient presented on that occasion noting that she was feeling significantly better with no new or active cardiac symptoms since initiating the extra diuretic..  She presents today with 2 episodes of near syncope the initial 1 associated with getting up quickly after having had 2 alcoholic beverages.  She did hit her head and had a very mild traumatic subdural.  She was taken to [facility] antiplatelet therapy was held and she presents today for reevaluation with no ongoing headache or other neurological complaints.  She admits she is not hydrating well, her BUN most recent lab work from December 10 was notable for a BUN of 9.9 and a creatinine of 0.49.  proBNP was 647 and she admits to not drinking well.  Patient had event recorder placed on December 12 and there were frequent short-lived episodes of SVT at that time her metoprolol was increased to 25 twice daily.  She presents today to evaluate her response to therapy.  She denies dizziness or lightheadedness does not perceive any more palpitations and feels generally well

## 2025-01-30 NOTE — HISTORY OF PRESENT ILLNESS
[FreeTextEntry1] : Patient is a 77-year-old white female with advanced ASCVD, peripheral vascular disease and status post coronary bypass surgery and multiple interventions for revascularization who well-known to me since age 38 and was seen initially on August 3  in the office here to establish her ongoing cardiac care here and presents to the office several weeks ago for a brief follow-up, blood pressure check and as well as to review a battery of cardiac and vascular testing scheduled at the time of her last visit. Before her recent testing she had a Lexiscan study performed August 29 that revealed a preserved ejection fraction and no evidence of active ischemia.  She underwent echocardiography on September 22 which is outlined below in cardiac summary and noted an elevated pulmonary pressure to 49 not previously recorded as that high.  Patient's prior cardiac history is notable for presenting at age 36 with new onset angina ,diagnostic cardiac cath was notable for left main disease and she underwent a quadruple bypass at age 38 with Dr. Justin Good at [facility].. She has been followed since that time with stress testing on a regular basis she unfortunately had a premorbid history of smoking 3 packs/day accounting for her aggressive vascular disease at a young age.. Patient has had other significant medical and cardiovascular issues she developed progressive claudication and underwent right popliteal stenosis repair with Dr. Corbett at [facility] for severe SFA stenosis. She has had vascular work done by Dr. Vazquez at [place] for recurrent and progressive symptomatic peripheral vascular disease. She is status post abdominal hernia repair with Dr. Webb within the last 2 years and is status post a right total hip replacement April 21 with Dr. Gordon Garces.   She is status post recent PTCA and stenting to circumflex lesion that was found on routine Lexiscan stress test in February 2022. At the time of that stress test there was mild reversible ischemia in a larger the circumflex and a right coronary with an EF of 43%. She underwent cardiac cath and was found to have circumflex disease and was stented at that time by Dr. Olman Wheatley..  When she presented here in August she was without any angina. She could and continues to walk 1 mile with 1 break. At a half a mile she notes no overt claudication but a heaviness in her legs that is new compared to a year ago. She is chronically on Trental and a host of other medications including aspirin nitrates beta-blockers antihypertensives as well as statins and Effient and aspirin..  At the time of her last visit she presented presents for her final study of arterial Dopplers of her lower extremities but ABIs were not able to be done.  They had been done subsequently and she presents today to review these in conjunction with her other studies as well as a brief follow-up to see her response to the addition of diuretics for elevated pulmonary pressures.  She seems to be doing well, denies PND orthopnea, trace lower extremity edema related to previous vascular surgery, no PND orthopnea and her exercise tolerance is slightly improved since being on the diuretic with less dyspnea noted.  She is taking and tolerating her increased dose of beta-blockers, rare lightheadedness mostly with postural changes otherwise has responded well to this with no described arrhythmias

## 2025-01-30 NOTE — CARDIOLOGY SUMMARY
[de-identified] : (12/12/2024) EKG: NSR at 74 beats minute with a frontal QRS axis +15 degrees, inferior MI indeterminate age, nonspecific IVCD otherwise normal trace  [de-identified] :  (7/16/2024) NUCLEAR STRESS TEST, 1. Myocardial Perfusion: Abnormal. 2. Stress electrocardiogram: No significant ischemic ST segment changes. 3. Qualitative Perfusion: - medium-sized, severe defect(s) in the inferolateral and lateral walls that are fixed, doesn't normalize with prone imaging suggestive of an infarct. 4. The post stress left ventricular EF is 70 %. 5. Akinesis of the inferolateral and lateral walls.    [de-identified] :  (11/26/2024) CAROTID DUPLEX CONCLUSIONS:  1. Moderate heterogeneous and calcified non-obstructive atherosclerosis seen bilaterally. 2. Right mid ICA segment is very tortuous. 3. Left Vertebral Artery: antegrade flow. elevated PSV noted (133.40 cm/s). 4. Vertebral arteries: antegrade flow bilaterally. 5. Right: proximal ECA elevated velocity suggestive of narowing. 6. Compared to the carotid report performed on 9/22/2023, elevated left vert PSV, increased disease noted.   (11/26/2024) ANKLE BRACHIAL INDEX CONCLUSIONS:  1. Right: Mildly abnormal TAYLOR 0.73 and ankle waveforms. Slight decrease post exercise. TBI is moderately reduced 0.36. 2. Left: Mildly abnormal TAYLOR 0.73 and ankle waveforms. Slight decrease post exercise. TBI is moderately reduced 0.43. 3. Compared to the TAYLOR report performed on 12/5/2023, decrease in left TAYLOR and bilateral TBI. 4. Consider lower arterial ultrasound for further characterization if clinically indicated. [de-identified] : (11/26//2024) ECHOCARDIOGRAPHIC CONCLUSIONS 1. Technically difficult image quality. 2. Left ventricular systolic function is normal with an ejection fraction visually estimated at 65 %. 3. Normal left ventricular diastolic function. 4. Mild aortic stenosis. Peak velocity 2.17 m/s, mean gradient 8.6 mmHg, VTI ratio 0.62, VICKI 1.82 cm by continuity equation. Gradients may be underestimated due to TDS. Trace aortic regurgitation. 5. Mild mitral valve stenosis. 6. There is calcification of the mitral valve annulus. 7. Moderate mitral regurgitation. Normal pulmonary venous flow. 8. No tricuspid regurgitation. Pulmonary artery systolic pressure could not be estimated. 9. No pericardial effusion seen. 10. Compared to the transthoracic echocardiogram performed on 9/22/2023,.    (9/24/2023) ECHOCARDIOGRAPHIC CONCLUSIONS:  1. Left ventricular systolic function is hyperdynamic with an ejection fraction of 57 % by Pressley's method of disks. 2. Basal inferior segment is abnormal. 3. There is moderate (grade 2) left ventricular diastolic dysfunction. 4. Right ventricular cavity is normal in size and probably normal systolic function. 5. There is moderate calcification of the mitral valve annulus. 6. There is mild mitral valve stenosis. 7. Mild to moderate mitral regurgitation. 8. The left atrium is mildly dilated in size. 9. Mild aortic stenosis. 10. No pericardial effusion seen. 11. Estimated pulmonary artery systolic pressure is 49 mmHg, consistent with mild to moderate pulmonary hypertension. 12. Mild pulmonic regurgitation. 13. Lipomatous interatrial septal hypertrophy present. 14. Echo-free space is noted in the liver consistent with cyst, however, dedicated imaging recommended for further evaluation if clinically indicated. 15. No prior echocardiogram is available for comparison.

## 2025-01-30 NOTE — DISCUSSION/SUMMARY
[FreeTextEntry1] : Patient is a 77-year-old white female with advanced ischemic and peripheral vascular disease,, status post quadruple bypass at age 38 status post multiple interventions last being in 2022 to a  circumflex lesion who presents to the office today review multiple vascular studies and she is now on mild aortic stenosis which has remained mild and her ejection fraction is preserved at 65%.  She recently had episodic SVT and in association with the recent fall and known ischemic heart disease her beta-blocker has been increased.  She seems to have tolerated it well without hemodynamic compromise and blood pressure today and exam was within normal limits.  Patient is up-to-date with previous carotid artery disease that also looks stable on recent study and her lower extremity Dopplers with TAYLOR indexes reveal quite satisfactory and disease indices of 0.73 bilaterally with only a distal toe being potentially Rise but she is relatively asymptomatic.  Patient was reassured regarding her vascular studies.  She has preserved ejection fraction, her valvular heart disease is stable and her vascular disease has not progressed either objectively or clinically.  No additional changes were made to her medical regimen.  She was advised to return to the office in approximately  8 weeks for her routine interval follow-up.  (See all studies in cardiology summary)  Joel Goldberg, MD, FACC     [EKG obtained to assist in diagnosis and management of assessed problem(s)] : EKG obtained to assist in diagnosis and management of assessed problem(s)

## 2025-01-30 NOTE — PHYSICAL EXAM
[Normal] : normal conjunctiva [Carotid Bruit] : carotid bruit [Normal S1, S2] : normal S1, S2 [No Gallop] : no gallop [Murmur] : murmur [Diminished Pedal Pulses ___] : diminished pedal pulses [unfilled] [de-identified] : Bilateral right greater than left [de-identified] : Grade 1/6 to 2/6 apical MR murmur

## 2025-03-10 NOTE — REASON FOR VISIT
[CV Risk Factors and Non-Cardiac Disease] : CV risk factors and non-cardiac disease [Structural Heart and Valve Disease] : structural heart and valve disease [Hyperlipidemia] : hyperlipidemia [Hypertension] : hypertension [Coronary Artery Disease] : coronary artery disease [Other: ____] : [unfilled] [FreeTextEntry1] : Patient is a 77-year-old white female with advanced ischemic and ischemic peripheral vas disease who presents the office today for routine interval follow-up.  Previously she was here after having supposedly titrated upwards her beta-blockerin an  attempt to suppress episodic SVT seen on her last 3-day Zio patch she was confused and did not actually implement this change yet she presents today fortunately with no new or active symptomatology.  Patient is well-known to the practice, she has with advanced ischemic heart and peripheral vascular disease.  Patient previously had been seen in the office on November 27 to review her duplex scan of her carotid arteries, and updated echocardiogram to evaluate LV function as well as to review arterial Dopplers of the lower extremity and TAYLOR index to evaluate the status of her advanced ischemic peripheral vas disease..  Prior to her vascular testing  she was seen here at the end of October for routine interval follow-up and  will follow-up again in January for routine visit.  When she was here in July was  to review her nuclear stress test which was recommended at part of her routine follow-up.  She had presented in the summer  with a new complaint of difficulty breathing, she was here 2 months prior to that and noted increasing fatigue and poor sleep patterns. Her Lexiscan stress test was without evidence of provokable ischemia with a preserved ejection fraction of 70% (see cardiology summary below)  Patient presented at the end of October depressed over her daughters situation as she has advanced liver disease but otherwise expresses no new or active cardiac concerns or complaints.  She notes she has been exercising a lot less but has no symptoms with routine activities, she is taking and tolerating all of her medications and otherwise has no other complaints at this time  At the time of her last evaluation for increased dyspnea a diuretic was implemented on alternate day, she was previously seen in the  office  to evaluate her response to the addition of diuretics based on elevated pulmonary pressures at the time of last echo.  Patient presented on that occasion noting that she was feeling significantly better with no new or active cardiac symptoms since initiating the extra diuretic..  She presents today with 2 episodes of near syncope the initial one associated with getting up quickly after having had 2 alcoholic beverages.  She did hit her head and had a very mild traumatic subdural.  She was taken to [facility] antiplatelet therapy was held and she presents today for reevaluation with no ongoing headache or other neurological complaints.  She admits she is not hydrating well, her BUN most recent lab work from December 10 was notable for a BUN of 9.9 and a creatinine of 0.49.  proBNP was 647 and she admits to not drinking well.  Patient had event recorder placed on December 12 and there were frequent short-lived episodes of SVT at that time her metoprolol was increased to 25 twice daily.  She presents today to evaluate her response to a change in therapy which ultimately was up to 37.5 twice a day which she did not implement correctly.  She presents today otherwise without any new or active concerns or complaints but does not understand the dose of metoprolol she is supposed to be on at this time.

## 2025-03-10 NOTE — REVIEW OF SYSTEMS
[Dyspnea on exertion] : dyspnea during exertion [Dizziness] : dizziness [Negative] : Heme/Lymph [SOB] : no shortness of breath [Chest Discomfort] : no chest discomfort [Lower Ext Edema] : no extremity edema [Leg Claudication] : no intermittent leg claudication [Palpitations] : no palpitations [Orthopnea] : no orthopnea [PND] : no PND [Syncope] : no syncope [de-identified] : Near syncope

## 2025-03-10 NOTE — REVIEW OF SYSTEMS
[Dyspnea on exertion] : dyspnea during exertion [Dizziness] : dizziness [Negative] : Heme/Lymph [SOB] : no shortness of breath [Chest Discomfort] : no chest discomfort [Lower Ext Edema] : no extremity edema [Leg Claudication] : no intermittent leg claudication [Palpitations] : no palpitations [Orthopnea] : no orthopnea [PND] : no PND [Syncope] : no syncope [de-identified] : Near syncope

## 2025-03-10 NOTE — CARDIOLOGY SUMMARY
[de-identified] : (12/12/2024) EKG: NSR at 74 beats minute with a frontal QRS axis +15 degrees, inferior MI indeterminate age, nonspecific IVCD otherwise normal trace  [de-identified] : (7/16/2024) NUCLEAR STRESS TEST, 1. Myocardial Perfusion: Abnormal. 2. Stress electrocardiogram: No significant ischemic ST segment changes. 3. Qualitative Perfusion: - medium-sized, severe defect(s) in the inferolateral and lateral walls that are fixed, doesn't normalize with prone imaging suggestive of an infarct. 4. The post stress left ventricular EF is 70 %. 5. Akinesis of the inferolateral and lateral walls.   [de-identified] :  (11/26//2024) ECHOCARDIOGRAPHIC CONCLUSIONS 1. Technically difficult image quality. 2. Left ventricular systolic function is normal with an ejection fraction visually estimated at 65 %. 3. Normal left ventricular diastolic function. 4. Mild aortic stenosis. Peak velocity 2.17 m/s, mean gradient 8.6 mmHg, VTI ratio 0.62, VICKI 1.82 cm by continuity equation. Gradients may be underestimated due to TDS. Trace aortic regurgitation. 5. Mild mitral valve stenosis. 6. There is calcification of the mitral valve annulus. 7. Moderate mitral regurgitation. Normal pulmonary venous flow. 8. No tricuspid regurgitation. Pulmonary artery systolic pressure could not be estimated. 9. No pericardial effusion seen. 10. Compared to the transthoracic echocardiogram performed on 9/22/2023,.  [de-identified] :  (11/26/2024) ANKLE BRACHIAL INDEX CONCLUSIONS:  1. Right: Mildly abnormal TAYLOR 0.73 and ankle waveforms. Slight decrease post exercise. TBI is moderately reduced 0.36. 2. Left: Mildly abnormal TAYLOR 0.73 and ankle waveforms. Slight decrease post exercise. TBI is moderately reduced 0.43. 3. Compared to the TAYLOR report performed on 12/5/2023, decrease in left TAYLOR and bilateral TBI. 4. Consider lower arterial ultrasound for further characterization if clinically indicated.   (11/26/2024) CAROTID DUPLEX CONCLUSIONS:  1. Moderate heterogeneous and calcified non-obstructive atherosclerosis seen bilaterally. 2. Right mid ICA segment is very tortuous. 3. Left Vertebral Artery: antegrade flow. elevated PSV noted (133.40 cm/s). 4. Vertebral arteries: antegrade flow bilaterally. 5. Right: proximal ECA elevated velocity suggestive of narowing. 6. Compared to the carotid report performed on 9/22/2023, elevated left vert PSV, increased disease noted.

## 2025-03-10 NOTE — CARDIOLOGY SUMMARY
[de-identified] : (12/12/2024) EKG: NSR at 74 beats minute with a frontal QRS axis +15 degrees, inferior MI indeterminate age, nonspecific IVCD otherwise normal trace  [de-identified] : (7/16/2024) NUCLEAR STRESS TEST, 1. Myocardial Perfusion: Abnormal. 2. Stress electrocardiogram: No significant ischemic ST segment changes. 3. Qualitative Perfusion: - medium-sized, severe defect(s) in the inferolateral and lateral walls that are fixed, doesn't normalize with prone imaging suggestive of an infarct. 4. The post stress left ventricular EF is 70 %. 5. Akinesis of the inferolateral and lateral walls.   [de-identified] :  (11/26//2024) ECHOCARDIOGRAPHIC CONCLUSIONS 1. Technically difficult image quality. 2. Left ventricular systolic function is normal with an ejection fraction visually estimated at 65 %. 3. Normal left ventricular diastolic function. 4. Mild aortic stenosis. Peak velocity 2.17 m/s, mean gradient 8.6 mmHg, VTI ratio 0.62, VICKI 1.82 cm by continuity equation. Gradients may be underestimated due to TDS. Trace aortic regurgitation. 5. Mild mitral valve stenosis. 6. There is calcification of the mitral valve annulus. 7. Moderate mitral regurgitation. Normal pulmonary venous flow. 8. No tricuspid regurgitation. Pulmonary artery systolic pressure could not be estimated. 9. No pericardial effusion seen. 10. Compared to the transthoracic echocardiogram performed on 9/22/2023,.  [de-identified] :  (11/26/2024) ANKLE BRACHIAL INDEX CONCLUSIONS:  1. Right: Mildly abnormal TAYLOR 0.73 and ankle waveforms. Slight decrease post exercise. TBI is moderately reduced 0.36. 2. Left: Mildly abnormal TAYLOR 0.73 and ankle waveforms. Slight decrease post exercise. TBI is moderately reduced 0.43. 3. Compared to the TAYLOR report performed on 12/5/2023, decrease in left TAYLOR and bilateral TBI. 4. Consider lower arterial ultrasound for further characterization if clinically indicated.   (11/26/2024) CAROTID DUPLEX CONCLUSIONS:  1. Moderate heterogeneous and calcified non-obstructive atherosclerosis seen bilaterally. 2. Right mid ICA segment is very tortuous. 3. Left Vertebral Artery: antegrade flow. elevated PSV noted (133.40 cm/s). 4. Vertebral arteries: antegrade flow bilaterally. 5. Right: proximal ECA elevated velocity suggestive of narowing. 6. Compared to the carotid report performed on 9/22/2023, elevated left vert PSV, increased disease noted.

## 2025-03-10 NOTE — DISCUSSION/SUMMARY
[EKG obtained to assist in diagnosis and management of assessed problem(s)] : EKG obtained to assist in diagnosis and management of assessed problem(s) [FreeTextEntry1] : Patient is a 77-year-old white female with advanced ischemic and peripheral vascular disease,, status post quadruple bypass at age 38 status post multiple interventions last being in 2022 to a  circumflex lesion who presents to the office today for routine interval follow-up and evaluation of what should have been an up titration of her beta-blockers to 37.5 twice daily and metoprolol for recurrent SVTs.  Patient appears to be well compensated today, has no active signs or symptoms of ischemic heart disease and/or angina, she has no evidence of heart failure and her lower extremity disease is not producing claudication with her usual exercise.  She recently underwent multiple vascular studies and has mild aortic stenosis which has remained mild and her ejection fraction is preserved at 65%.  She recently had episodic SVT and in association with the recent fall and known ischemic heart disease her beta-blocker has been increased.  She seems to have tolerated it well without hemodynamic compromise and blood pressure today and exam was within normal limits.  Patient is up-to-date with previous carotid artery disease that also looks stable on recent study and her lower extremity Dopplers with TAYLOR indexes reveal quite satisfactory and disease indices of 0.73 bilaterally with only a distal toe being potentially Rise but she is relatively asymptomatic.  Patient was reassured regarding her vascular studies.  She has preserved ejection fraction, her valvular heart disease is stable and her vascular disease has not progressed either objectively or clinically.  No additional changes were made to her medical regimen.  She was advised to return to the office in May for her routine interval follow-up.  (See all studies in cardiology summary)  Joel Goldberg, MD, FACC

## 2025-03-10 NOTE — HISTORY OF PRESENT ILLNESS
[FreeTextEntry1] : Patient is a 77-year-old white female with advanced ASCVD, peripheral vascular disease and status post coronary bypass surgery and multiple interventions for revascularization who well-known to me since age 38 and was seen initially on August 3 2023 in the office here to establish her ongoing cardiac care here and presents to the office several weeks ago for a brief follow-up, blood pressure check and as well as to review a battery of cardiac and vascular testing scheduled at the time of her last visit. Before today's testing she had a Lexiscan study performed August 29 that revealed a preserved ejection fraction and no evidence of active ischemia.  She underwent echocardiography on September 22 which is outlined below in cardiac summary and noted an elevated pulmonary pressure to 49 not previously recorded as that high.  Patient's prior cardiac history is notable for presenting at age 36 with new onset angina ,diagnostic cardiac cath was notable for left main disease and she underwent a quadruple bypass at age 38 with Dr. Justin Good at [facility].. She has been followed since that time with stress testing on a regular basis she unfortunately had a premorbid history of smoking 3 packs/day accounting for her aggressive vascular disease at a young age.. Patient has had other significant medical and cardiovascular issues she developed progressive claudication and underwent right popliteal stenosis repair with Dr. Corbett at [facility] for severe SFA stenosis. She has had vascular work done by Dr. Echols at [place] for recurrent and progressive symptomatic peripheral vascular disease. She is status post abdominal hernia repair with Dr. Webb within the last 2 years and is status post a right total hip replacement April 21 with Dr. Gordon Garces.   She is status post recent PTCA and stenting to circumflex lesion that was found on routine Lexiscan stress test in February 2022. At the time of that stress test there was mild reversible ischemia in a larger the circumflex and a right coronary with an EF of 43%. She underwent cardiac cath and was found to have circumflex disease and was stented at that time by Dr. Olman Wheatley..  When she presented here last August she was without any angina. She could and continues to walk 1 mile with 1 break. At a half a mile she notes no overt claudication but a heaviness in her legs that is new compared to a year ago. She is chronically on Trental and a host of other medications including aspirin nitrates beta-blockers antihypertensives as well as statins and Effient and aspirin..  At the time of her last visit she presented presents for her final study of arterial Dopplers of her lower extremities but ABIs were not able to be done.  They had been done subsequently and she presents today to review these in conjunction with her other studies as well as a brief follow-up to see her response to the addition of diuretics for elevated pulmonary pressures.  She seems to be doing well, denies PND orthopnea, trace lower extremity edema related to previous vascular surgery, no PND orthopnea and her exercise tolerance is slightly improved since being on the diuretic with less dyspnea noted.  A 3-day Zio patch at the end of December 2024 was notable for 4 SVT episodes to a rate of 125.  Patient was advised at that point increase her metoprolol to 37.5 twice daily which she now understands and will implement.  Patient is up-to-date on all vascular testing, nuclear stress testing and echocardiography.  (See cardiology summary below)

## 2025-03-10 NOTE — HISTORY OF PRESENT ILLNESS
[FreeTextEntry1] : Patient is a 77-year-old white female with advanced ASCVD, peripheral vascular disease and status post coronary bypass surgery and multiple interventions for revascularization who well-known to me since age 38 and was seen initially on August 3 2023 in the office here to establish her ongoing cardiac care here and presents to the office several weeks ago for a brief follow-up, blood pressure check and as well as to review a battery of cardiac and vascular testing scheduled at the time of her last visit. Before today's testing she had a Lexiscan study performed August 29 that revealed a preserved ejection fraction and no evidence of active ischemia.  She underwent echocardiography on September 22 which is outlined below in cardiac summary and noted an elevated pulmonary pressure to 49 not previously recorded as that high.  Patient's prior cardiac history is notable for presenting at age 36 with new onset angina ,diagnostic cardiac cath was notable for left main disease and she underwent a quadruple bypass at age 38 with Dr. Justin Good at [facility].. She has been followed since that time with stress testing on a regular basis she unfortunately had a premorbid history of smoking 3 packs/day accounting for her aggressive vascular disease at a young age.. Patient has had other significant medical and cardiovascular issues she developed progressive claudication and underwent right popliteal stenosis repair with Dr. Corbett at [facility] for severe SFA stenosis. She has had vascular work done by Dr. Echols at [place] for recurrent and progressive symptomatic peripheral vascular disease. She is status post abdominal hernia repair with Dr. Webb within the last 2 years and is status post a right total hip replacement April 21 with Dr. Gordon Garces.   She is status post recent PTCA and stenting to circumflex lesion that was found on routine Lexiscan stress test in February 2022. At the time of that stress test there was mild reversible ischemia in a larger the circumflex and a right coronary with an EF of 43%. She underwent cardiac cath and was found to have circumflex disease and was stented at that time by Dr. Olman Wheatley..  When she presented here last August she was without any angina. She could and continues to walk 1 mile with 1 break. At a half a mile she notes no overt claudication but a heaviness in her legs that is new compared to a year ago. She is chronically on Trental and a host of other medications including aspirin nitrates beta-blockers antihypertensives as well as statins and Effient and aspirin..  At the time of her last visit she presented presents for her final study of arterial Dopplers of her lower extremities but ABIs were not able to be done.  They had been done subsequently and she presents today to review these in conjunction with her other studies as well as a brief follow-up to see her response to the addition of diuretics for elevated pulmonary pressures.  She seems to be doing well, denies PND orthopnea, trace lower extremity edema related to previous vascular surgery, no PND orthopnea and her exercise tolerance is slightly improved since being on the diuretic with less dyspnea noted.  A 3-day Zio patch at the end of December 2024 was notable for 4 SVT episodes to a rate of 125.  Patient was advised at that point increase her metoprolol to 37.5 twice daily which she now understands and will implement.  Patient is up-to-date on all vascular testing, nuclear stress testing and echocardiography.  (See cardiology summary below)

## 2025-03-10 NOTE — PHYSICAL EXAM
[Normal] : normal conjunctiva [Carotid Bruit] : carotid bruit [Normal S1, S2] : normal S1, S2 [No Gallop] : no gallop [Murmur] : murmur [Diminished Pedal Pulses ___] : diminished pedal pulses [unfilled] [de-identified] : Bilateral right greater than left [de-identified] : Grade 1/6 to 2/6 apical MR murmur

## 2025-03-10 NOTE — PHYSICAL EXAM
[Normal] : normal conjunctiva [Carotid Bruit] : carotid bruit [Normal S1, S2] : normal S1, S2 [No Gallop] : no gallop [Murmur] : murmur [Diminished Pedal Pulses ___] : diminished pedal pulses [unfilled] [de-identified] : Bilateral right greater than left [de-identified] : Grade 1/6 to 2/6 apical MR murmur

## 2025-03-10 NOTE — REASON FOR VISIT
[CV Risk Factors and Non-Cardiac Disease] : CV risk factors and non-cardiac disease [Structural Heart and Valve Disease] : structural heart and valve disease [Hyperlipidemia] : hyperlipidemia [Hypertension] : hypertension [Coronary Artery Disease] : coronary artery disease [Other: ____] : [unfilled] [FreeTextEntry1] : Patient is a 77-year-old white female with advanced ischemic and ischemic peripheral vas disease who presents the office today for routine interval follow-up.  Previously she was here after having supposedly titrated upwards her beta-blockerin an  attempt to suppress episodic SVT seen on her last 3-day Zio patch she was confused and did not actually implement this change yet she presents today fortunately with no new or active symptomatology.  Patient is well-known to the practice, she has with advanced ischemic heart and peripheral vascular disease.  Patient previously had been seen in the office on November 27 to review her duplex scan of her carotid arteries, and updated echocardiogram to evaluate LV function as well as to review arterial Dopplers of the lower extremity and TAYLOR index to evaluate the status of her advanced ischemic peripheral vas disease..  Prior to her vascular testing  she was seen here at the end of October for routine interval follow-up and  will follow-up again in January for routine visit.  When she was here in July was  to review her nuclear stress test which was recommended at part of her routine follow-up.  She had presented in the summer  with a new complaint of difficulty breathing, she was here 2 months prior to that and noted increasing fatigue and poor sleep patterns. Her Lexiscan stress test was without evidence of provokable ischemia with a preserved ejection fraction of 70% (see cardiology summary below)  Patient presented at the end of October depressed over her daughters situation as she has advanced liver disease but otherwise expresses no new or active cardiac concerns or complaints.  She notes she has been exercising a lot less but has no symptoms with routine activities, she is taking and tolerating all of her medications and otherwise has no other complaints at this time  At the time of her last evaluation for increased dyspnea a diuretic was implemented on alternate day, she was previously seen in the  office  to evaluate her response to the addition of diuretics based on elevated pulmonary pressures at the time of last echo.  Patient presented on that occasion noting that she was feeling significantly better with no new or active cardiac symptoms since initiating the extra diuretic..  She presents today with 2 episodes of near syncope the initial one associated with getting up quickly after having had 2 alcoholic beverages.  She did hit her head and had a very mild traumatic subdural.  She was taken to [facility] antiplatelet therapy was held and she presents today for reevaluation with no ongoing headache or other neurological complaints.  She admits she is not hydrating well, her BUN most recent lab work from December 10 was notable for a BUN of 9.9 and a creatinine of 0.49.  proBNP was 647 and she admits to not drinking well.  Patient had event recorder placed on December 12 and there were frequent short-lived episodes of SVT at that time her metoprolol was increased to 25 twice daily.  She presents today to evaluate her response to a change in therapy which ultimately was up to 37.5 twice a day which she did not implement correctly.  She presents today otherwise without any new or active concerns or complaints but does not understand the dose of metoprolol she is supposed to be on at this time.

## 2025-04-29 NOTE — HISTORY OF PRESENT ILLNESS
[FreeTextEntry1] : Patient is a 77-year-old white female with advanced ASCVD, peripheral vascular disease and status post coronary bypass surgery and multiple interventions for revascularization who well-known to me since age 38 and was seen initially on August 3 2023 in the office here to establish her ongoing cardiac care here and presents to the office several weeks ago for a brief follow-up, blood pressure check and as well as to review a battery of cardiac and vascular testing scheduled at the time of her last visit. Before today's testing she had a Lexiscan study performed August 29 that revealed a preserved ejection fraction and no evidence of active ischemia.  She underwent echocardiography on September 22 which is outlined below in cardiac summary and noted an elevated pulmonary pressure to 49 not previously recorded as that high..  Diuretics were implemented, she presents today with lower extremity edema not taking diuretics for unclear reasons  Patient's prior cardiac history is notable for presenting at age 36 with new onset angina ,diagnostic cardiac cath was notable for left main disease and she underwent a quadruple bypass at age 38 with Dr. Justin Good at [facility].. She has been followed since that time with stress testing on a regular basis she unfortunately had a premorbid history of smoking 3 packs/day accounting for her aggressive vascular disease at a young age.. Patient has had other significant medical and cardiovascular issues she developed progressive claudication and underwent right popliteal stenosis repair with Dr. Corbett at [facility] for severe SFA stenosis. She has had vascular work done by Dr. Echols at [place] for recurrent and progressive symptomatic peripheral vascular disease. She is status post abdominal hernia repair with Dr. Webb within the last 2 years and is status post a right total hip replacement April 21 with Dr. Gordon Garces.   She is status post recent PTCA and stenting to circumflex lesion that was found on routine Lexiscan stress test in February 2022. At the time of that stress test there was mild reversible ischemia in a larger the circumflex and a right coronary with an EF of 43%. She underwent cardiac cath and was found to have circumflex disease and was stented at that time by Dr. Olman Wheatley..  When she presented here last August she was without any angina. She could and continues to walk 1 mile with 1 break. At a half a mile she notes no overt claudication but a heaviness in her legs that is new compared to a year ago. She is chronically on Trental and a host of other medications including aspirin nitrates beta-blockers antihypertensives as well as statins and Effient and aspirin..  At the time of her last visit she presented presents for her final study of arterial Dopplers of her lower extremities but ABIs were not able to be done.  They had been done subsequently and she presents today to review these in conjunction with her other studies as well as a brief follow-up to see her response to the addition of diuretics for elevated pulmonary pressures which for some reason were not [facility]med[facility] today.  She seems to be doing well, denies PND orthopnea, increased lower extremity edema related to previous vascular surgery and seemingly discontinued her diuretics at this time.  She has, no PND orthopnea   A 3-day Zio patch at the end of December 2024 was notable for 4 SVT episodes to a rate of 125.  Patient was advised at that point increase her metoprolol to 37.5 twice daily which she now understands and will implement.  Patient is up-to-date on all vascular testing, nuclear stress testing and echocardiography.  (See cardiology summary below)

## 2025-04-29 NOTE — PHYSICAL EXAM
[de-identified] : Bilateral right greater than left [de-identified] : Grade 1/6 to 2/6 apical MR murmur [de-identified] : 1-2+ left lower extremity trace right lower extremity edema

## 2025-04-29 NOTE — REVIEW OF SYSTEMS
[Weight Gain (___ Lbs)] : no recent weight gain [SOB] : no shortness of breath [Chest Discomfort] : no chest discomfort [Lower Ext Edema] : no extremity edema [Leg Claudication] : no intermittent leg claudication [Palpitations] : no palpitations [Orthopnea] : no orthopnea [PND] : no PND [Syncope] : no syncope [de-identified] : Near syncope

## 2025-04-29 NOTE — REASON FOR VISIT
[FreeTextEntry1] : Patient is a 77-year-old white female with advanced ischemic and ischemic peripheral vas disease who presents the office today with new complaints of increasing lower extremity edema without weight gain or increasing shortness of breath over the last several weeks.  Patient is well-known to this practice  she was last here after having supposedly titrated upwards her beta-blockerin an  attempt to suppress episodic SVT seen on her last 3-day Zio patch she was confused and did not actually implement this change yet she presents today fortunately with no new or active symptomatology.  She now is taking her metoprolol correctly at 37.5 mg twice daily and has no complaints of further arrhythmias.  On careful questioning the patient notes that the only new medication is minoxidil which she is now taking is a capsule form to prevent hair loss.  She already is on nifedipine and Isordil as she has no PND orthopnea and no change in renal function.  Patient is well-known to the practice, she has with advanced ischemic heart and peripheral vascular disease.  Patient previously had been seen in the office on November 27 to review her duplex scan of her carotid arteries, and updated echocardiogram to evaluate LV function as well as to review arterial Dopplers of the lower extremity and TAYLOR index to evaluate the status of her advanced ischemic peripheral vas disease..  Prior to her vascular testing  she was seen here at the end of October for routine interval follow-up and followed up again in January for routine visit.  When she was here last July it was  to review her nuclear stress test which was recommended at part of her routine follow-up.  She had presented in the summer  with a new complaint of difficulty breathing, she was here 2 months prior to that and noted increasing fatigue and poor sleep patterns. Her Lexiscan stress test was without evidence of provokable ischemia with a preserved ejection fraction of 70% (see cardiology summary below)  Patient presented at the end of October depressed over her daughters situation as she has advanced liver disease but otherwise expresses no new or active cardiac concerns or complaints.  She notes she has been exercising a lot less but has no symptoms with routine activities, she is taking and tolerating all of her medications and otherwise has no other complaints at this time  At the time of her last evaluation for increased dyspnea a diuretic was implemented on alternate day, she was previously seen in the  office  to evaluate her response to the addition of diuretics based on elevated pulmonary pressures at the time of last echo.  For some reason it appears that she is not taking this diuretic at this time which will be reimplemented on alternate day in view of her edema.    Patient last presented  noting that she was feeling significantly better with no new or active cardiac symptoms since initiating the extra diuretic..  She presents today with 2 episodes of near syncope the initial one associated with getting up quickly after having had 2 alcoholic beverages.  She did hit her head and had a very mild traumatic subdural.  She was taken to [facility] antiplatelet therapy was held and she presents today for reevaluation with no ongoing headache or other neurological complaints.  She admits she is not hydrating well, her BUN most recent lab work from December 10 was notable for a BUN of 9.9 and a creatinine of 0.49.  proBNP was 647 and she admits to not drinking well.  Patient had event recorder placed on December 12 and there were frequent short-lived episodes of SVT at that time her metoprolol was increased to 25 twice daily.  She presented after that episode to evaluate her response to a change in therapy which ultimately was up to 37.5 twice a day which she did not implement correctly.  She presents today otherwise without any new or active concerns other than increasing edema but appears that she is off her diuretic and has added minoxidil

## 2025-04-29 NOTE — DISCUSSION/SUMMARY
[FreeTextEntry1] : Patient is a 77-year-old white female with advanced ischemic and peripheral vascular disease,, status post quadruple bypass at age 38 status post multiple interventions last being in 2022 to a  circumflex lesion who presents to the office today with increasing edema, somehow off of her diuretic and had added minoxidil for hair loss to her regimen.  She has no signs or symptoms of heart failure, has not gained weight and has no PND orthopnea and at this point we will reimplement diuretics on an alternate day basis.  Regarding her other cardiac issues she is back on her beta-blockers at 37.5 twice daily and metoprolol for recurrent SVTs.  She has no complaints of ectopy today  Patient appears to be well compensated today, has no active signs or symptoms of ischemic heart disease and/or angina, she has no evidence of heart failure and her lower extremity disease is not producing claudication with her usual exercise.  Edema is likely multifactorial, elevated pulmonary pressures, minoxidil and her calcium channel blockers.  Hopefully the addition back of her diuretics will emanate the fluid.  She recently underwent multiple vascular studies and has mild aortic stenosis which has remained mild and her ejection fraction is preserved at 65%.  She recently had episodic SVT and in association with the recent fall and known ischemic heart disease her beta-blocker has been increased.  She seems to have tolerated it well without hemodynamic compromise and blood pressure today and exam was within normal limits.  Patient is up-to-date with previous carotid artery disease that also looks stable on recent study and her lower extremity Dopplers with TAYLOR indexes reveal quite satisfactory and disease indices of 0.73 bilaterally with only a distal toe being potentially Rise but she is relatively asymptomatic.  Patient was reassured regarding her vascular studies.  She has preserved ejection fraction, her valvular heart disease is stable and her vascular disease has not progressed either objectively or clinically.  She is advised to have a vascular consult Dr. Patricia when he next is in the [place] office so she has a vascular doctor here.  No additional changes were made to her medical regimen other than the addition of diuretics and alternate day.  She was advised to return to the office in May for her routine interval follow-up.  (See all studies in cardiology summary)  Joel Goldberg, MD, [facility]     [EKG obtained to assist in diagnosis and management of assessed problem(s)] : EKG obtained to assist in diagnosis and management of assessed problem(s)

## 2025-04-29 NOTE — CARDIOLOGY SUMMARY
[de-identified] : (4/29/2025) EKG: NSR at 73 bpm with a frontal QRS axis of of 30 degrees.  Inferior MI indeterminate age, decreased R wave across the precordial leads cannot rule out a MI of indeterminate age.  Nonspecific ST-T changes  (12/12/2024) EKG: NSR at 74 beats minute with a frontal QRS axis +15 degrees, inferior MI indeterminate age, nonspecific IVCD otherwise normal trace  [de-identified] : (7/16/2024) NUCLEAR STRESS TEST, 1. Myocardial Perfusion: Abnormal. 2. Stress electrocardiogram: No significant ischemic ST segment changes. 3. Qualitative Perfusion: - medium-sized, severe defect(s) in the inferolateral and lateral walls that are fixed, doesn't normalize with prone imaging suggestive of an infarct. 4. The post stress left ventricular EF is 70 %. 5. Akinesis of the inferolateral and lateral walls.   [de-identified] :  (11/26//2024) ECHOCARDIOGRAPHIC CONCLUSIONS 1. Technically difficult image quality. 2. Left ventricular systolic function is normal with an ejection fraction visually estimated at 65 %. 3. Normal left ventricular diastolic function. 4. Mild aortic stenosis. Peak velocity 2.17 m/s, mean gradient 8.6 mmHg, VTI ratio 0.62, VICKI 1.82 cm by continuity equation. Gradients may be underestimated due to TDS. Trace aortic regurgitation. 5. Mild mitral valve stenosis. 6. There is calcification of the mitral valve annulus. 7. Moderate mitral regurgitation. Normal pulmonary venous flow. 8. No tricuspid regurgitation. Pulmonary artery systolic pressure could not be estimated. 9. No pericardial effusion seen. 10. Compared to the transthoracic echocardiogram performed on 9/22/2023,.  [de-identified] :  (11/26/2024) ANKLE BRACHIAL INDEX CONCLUSIONS:  1. Right: Mildly abnormal TAYLOR 0.73 and ankle waveforms. Slight decrease post exercise. TBI is moderately reduced 0.36. 2. Left: Mildly abnormal TAYLOR 0.73 and ankle waveforms. Slight decrease post exercise. TBI is moderately reduced 0.43. 3. Compared to the TAYLOR report performed on 12/5/2023, decrease in left TAYLOR and bilateral TBI. 4. Consider lower arterial ultrasound for further characterization if clinically indicated.   (11/26/2024) CAROTID DUPLEX CONCLUSIONS:  1. Moderate heterogeneous and calcified non-obstructive atherosclerosis seen bilaterally. 2. Right mid ICA segment is very tortuous. 3. Left Vertebral Artery: antegrade flow. elevated PSV noted (133.40 cm/s). 4. Vertebral arteries: antegrade flow bilaterally. 5. Right: proximal ECA elevated velocity suggestive of narowing. 6. Compared to the carotid report performed on 9/22/2023, elevated left vert PSV, increased disease noted.

## 2025-05-14 NOTE — REASON FOR VISIT
[CV Risk Factors and Non-Cardiac Disease] : CV risk factors and non-cardiac disease [Structural Heart and Valve Disease] : structural heart and valve disease [Hyperlipidemia] : hyperlipidemia [Hypertension] : hypertension [Coronary Artery Disease] : coronary artery disease [Other: ____] : [unfilled] [FreeTextEntry1] : Patient is a 77-year-old white female with advanced ischemic and ischemic peripheral vas disease who presents the office today to review a 3-day Zio patch and to discuss beta-blocker therapy with regards to its effect on known atrial and ventricular ectopy.  Patient is seen on a regular basis in the office, when she last was here she had complaints of   increasing lower extremity edema without weight gain or increasing shortness of breath over the last several weeks.  Her beta-blockers had been increased in attempt to suppress ectopy and an updated Zio patch was recommended.  She presents today to review that data.  Patient is well-known to this practice  she was last here after having supposedly titrated upwards her beta-blockerin an  attempt to suppress episodic SVT seen on her last 3-day Zio patch she was confused and did not actually implement this change yet she presents today fortunately with no new or active symptomatology.  She is now taking her metoprolol correctly at 37.5 mg twice daily and this Zio patch represents her arrhythmic burden on that dose of medication.  She presents today noting that she has no complaints of further arrhythmias since increasing her medication and there is no hemodynamic compromise.  Patient had event recorder placed on December 12 and there were frequent short-lived episodes of SVT at that time her metoprolol was increased to 25 twice daily.  She presented after that episode to evaluate her response to a change in therapy which ultimately was up to 37.5 twice a day which she did not implement correctly.  She presents today otherwise without any new or active concerns, she is currently taking 37.5 twice daily metoprolol and presents today to discuss her 3-day Zio patch which was notable for a minimal heart rate of 56 maximum 98 average 71.  There were 5 SVT episodes none that she was aware of longest was 15 beats lasting 6 seconds and occurred in the early morning hours rare isolated PVCs were noted and is significantly reduced frequency compared to previous evaluations.

## 2025-05-14 NOTE — CARDIOLOGY SUMMARY
[de-identified] : (4/29/2025) EKG: NSR at 73 bpm with a frontal QRS axis of of 30 degrees.  Inferior MI indeterminate age, decreased R wave across the precordial leads cannot rule out a MI of indeterminate age.  Nonspecific ST-T changes  (12/12/2024) EKG: NSR at 74 beats minute with a frontal QRS axis +15 degrees, inferior MI indeterminate age, nonspecific IVCD otherwise normal trace  [de-identified] : (5/14/2025) 3-day Zio patch running from 5/1/2025 to 5/4/2025 revealed a minimum heart rate of 56, maximum of 145 average of 71.  Isolated 8 PVCs 20-39 less than 1% 16 couplets less than 1% 1 triplet less than 1% ventricular ectopy rare less than 1% 1980 [de-identified] : (7/16/2024) NUCLEAR STRESS TEST, 1. Myocardial Perfusion: Abnormal. 2. Stress electrocardiogram: No significant ischemic ST segment changes. 3. Qualitative Perfusion: - medium-sized, severe defect(s) in the inferolateral and lateral walls that are fixed, doesn't normalize with prone imaging suggestive of an infarct. 4. The post stress left ventricular EF is 70 %. 5. Akinesis of the inferolateral and lateral walls.   [de-identified] :  (11/26//2024) ECHOCARDIOGRAPHIC CONCLUSIONS 1. Technically difficult image quality. 2. Left ventricular systolic function is normal with an ejection fraction visually estimated at 65 %. 3. Normal left ventricular diastolic function. 4. Mild aortic stenosis. Peak velocity 2.17 m/s, mean gradient 8.6 mmHg, VTI ratio 0.62, VICKI 1.82 cm by continuity equation. Gradients may be underestimated due to TDS. Trace aortic regurgitation. 5. Mild mitral valve stenosis. 6. There is calcification of the mitral valve annulus. 7. Moderate mitral regurgitation. Normal pulmonary venous flow. 8. No tricuspid regurgitation. Pulmonary artery systolic pressure could not be estimated. 9. No pericardial effusion seen. 10. Compared to the transthoracic echocardiogram performed on 9/22/2023,.  [de-identified] :  (11/26/2024) ANKLE BRACHIAL INDEX CONCLUSIONS:  1. Right: Mildly abnormal TAYLOR 0.73 and ankle waveforms. Slight decrease post exercise. TBI is moderately reduced 0.36. 2. Left: Mildly abnormal TAYLOR 0.73 and ankle waveforms. Slight decrease post exercise. TBI is moderately reduced 0.43. 3. Compared to the TAYLOR report performed on 12/5/2023, decrease in left TAYLOR and bilateral TBI. 4. Consider lower arterial ultrasound for further characterization if clinically indicated.   (11/26/2024) CAROTID DUPLEX CONCLUSIONS:  1. Moderate heterogeneous and calcified non-obstructive atherosclerosis seen bilaterally. 2. Right mid ICA segment is very tortuous. 3. Left Vertebral Artery: antegrade flow. elevated PSV noted (133.40 cm/s). 4. Vertebral arteries: antegrade flow bilaterally. 5. Right: proximal ECA elevated velocity suggestive of narowing. 6. Compared to the carotid report performed on 9/22/2023, elevated left vert PSV, increased disease noted.

## 2025-05-14 NOTE — DISCUSSION/SUMMARY
[FreeTextEntry1] : Patient is a 77-year-old white female with advanced ischemic and peripheral vascular disease,, status post quadruple bypass at age 38 status post multiple interventions last being in 2022 to a  circumflex lesion who presents to the office today with increasing edema, somehow off of her diuretic and had added minoxidil for hair loss to her regimen.  She has no signs or symptoms of heart failure, has not gained weight and has no PND orthopnea and at this point we will reimplement diuretics on an alternate day basis.  Patient presents today to review recent Zio patch as she was having episodes of SVT.  The frequency of the SVTs and severity regarding duration is significantly improved and she is tolerating higher dose metoprolol 37.5 twice daily.  Patient was reassured by the findings on her 3-day Zio patch, she will continue on current therapy as well as magnesium otherwise no changes made to her medical regimen.  Joel Goldberg MD [facility]

## 2025-05-14 NOTE — REVIEW OF SYSTEMS
[Dyspnea on exertion] : dyspnea during exertion [Dizziness] : dizziness [Negative] : Heme/Lymph [Weight Gain (___ Lbs)] : no recent weight gain [SOB] : no shortness of breath [Chest Discomfort] : no chest discomfort [Lower Ext Edema] : no extremity edema [Leg Claudication] : no intermittent leg claudication [Palpitations] : no palpitations [Orthopnea] : no orthopnea [PND] : no PND [Syncope] : no syncope [de-identified] : Near syncope

## 2025-07-22 NOTE — PHYSICAL EXAM
[Normal] : normal conjunctiva [Carotid Bruit] : carotid bruit [Normal S1, S2] : normal S1, S2 [No Gallop] : no gallop [Murmur] : murmur [Diminished Pedal Pulses ___] : diminished pedal pulses [unfilled] [de-identified] : Bilateral right greater than left [de-identified] : Grade 1/6 to 2/6 apical MR murmur [de-identified] : 1-2+ left lower extremity trace right lower extremity edema

## 2025-07-22 NOTE — REASON FOR VISIT
[CV Risk Factors and Non-Cardiac Disease] : CV risk factors and non-cardiac disease [Structural Heart and Valve Disease] : structural heart and valve disease [Hyperlipidemia] : hyperlipidemia [Hypertension] : hypertension [Coronary Artery Disease] : coronary artery disease [Other: ____] : [unfilled] [FreeTextEntry1] : Patient is a 78-year-old white female with advanced ischemic and ischemic peripheral vas disease who presents the office today to review a battery tests ordered by Dr. Patricia including a CT angio of her abdominal aorta with runoff as well as recent TAYLOR indexes to evaluate the perfusion of her lower extremities.  When she last was here was review a 3-day Zio patch and to discuss confusion about proper dosing of her beta-blocker therapy with regards to its effect on known atrial and ventricular ectopy.  Patient is seen on a regular basis in the office, when she last was here she had complaints of  increasing lower extremity edema without weight gain or increasing shortness of breath over the last several weeks.  Her beta-blockers had been increased in attempt to suppress ectopy and an updated Zio patch was recommended which revealed no significant bradycardia or tachyarrhythmias but 1 episode of 15 beats of NSVT and therefore she understood why additional beta-blockers were needed to be implemented.  Patient is well-known to this practice  she was last here after having supposedly titrated upwards her beta-blocker in an  attempt to suppress episodic SVT seen on her last 3-day Zio patch she was confused and did not actually implement this change yet she presents today fortunately with no new or active symptomatology.  She is now taking her metoprolol correctly at 37.5 mg twice daily and this most recent Zio patch represents her arrhythmic burden on that dose of medication.  She presents today noting that she has no complaints of further arrhythmias since increasing her medication and there is no hemodynamic compromise.  She has continued symptomatology regarding exertional lower extremity claudication which is mild and she can walk a mile only having to stop on rare occasion for brief periods.  She presents today with ongoing 10 days of bronchitis treated with doxycycline and was referred for a PA lateral chest x-ray  Patient had event recorder placed on December 12 and there were frequent short-lived episodes of SVT at that time her metoprolol was increased to 25 twice daily.  She presented after that episode to evaluate her response to a change in therapy which ultimately was up to 37.5 twice a day which she did not implement correctly.  She presents today otherwise without any new or active concerns, she is currently taking 37.5 twice daily metoprolol and presented at last visit to discuss her 3-day Zio patch which was notable for a minimal heart rate of 56 maximum 98 average 71.  There were 5 SVT episodes none that she was aware of longest was 15 beats lasting 6 seconds and occurred in the early morning hours rare isolated PVCs were noted and is significantly reduced frequency compared to previous evaluations.

## 2025-07-22 NOTE — DISCUSSION/SUMMARY
[FreeTextEntry1] : Patient is a 78-year-old white female with advanced ischemic and peripheral vascular disease,, with recent studies revealed clinical stability.  She is remotely status post quadruple bypass at age 38 status post multiple interventions last being in 2022 to a  circumflex lesion who presents to the office today looking and feeling well, her edema has resolved with increasing her diuretic, her tachycardia arrhythmias are well-controlled on her current beta-blocker regimen and she has  no signs or symptoms of heart failure, has not gained weight and has no PND orthopnea and at this point we will reimplement diuretics on an alternate day basis.  Patient presented recently to review recent Zio patch as she was having episodes of SVT.  The frequency of the SVTs and severity regarding duration is significantly improved and she is tolerating higher dose metoprolol 37.5 twice daily.  Patient was reassured by the findings on her 3-day Zio patch, she was given the results of her vascular studies and informed that symptoms would dictate her management and that there was no limb threatening issues at this time.  Patient was advised to continue on current therapy as well as magnesium otherwise no changes made to her medical regimen and will be seeing Dr. Patricia in formal consultation.  On August 5 to review all of her vascular studies with him directly.  Joel Goldberg MD [facility]    [EKG obtained to assist in diagnosis and management of assessed problem(s)] : EKG obtained to assist in diagnosis and management of assessed problem(s)

## 2025-07-22 NOTE — HISTORY OF PRESENT ILLNESS
[FreeTextEntry1] : Patient is a 78-year-old white female with advanced ASCVD, peripheral vascular disease and status post coronary bypass surgery and multiple interventions for revascularization who well-known to me since age 38 and was seen initially on August 3 2023 in the office here to establish her ongoing cardiac care here and presents to the office several weeks ago for a brief follow-up, blood pressure check and as well as to review a battery of cardiac and vascular testing scheduled at the time of her last visit.   Before today's testing she had a Lexiscan study performed July 2024 that revealed a preserved ejection fraction and no evidence of active ischemia.  She underwent echocardiography on September 22 which is outlined below in cardiac summary and noted an elevated pulmonary pressure to 49 not previously recorded as that high.  Patient's prior cardiac history is notable for presenting at age 36 with new onset angina ,diagnostic cardiac cath was notable for left main disease and she underwent a quadruple bypass at age 38 with Dr. Justin Good at [facility].. She has been followed since that time with stress testing on a regular basis she unfortunately had a premorbid history of smoking 3 packs/day accounting for her aggressive vascular disease at a young age.. Patient has had other significant medical and cardiovascular issues she developed progressive claudication and underwent right popliteal stenosis repair with Dr. Corbett at [facility] for severe SFA stenosis. She has had vascular work done by Dr. Echols at [place] for recurrent and progressive symptomatic peripheral vascular disease. She is status post abdominal hernia repair with Dr. Webb within the last 2 years and is status post a right total hip replacement April 21 with Dr. Gordon Garces.   She is status post recent PTCA and stenting to circumflex lesion that was found on routine Lexiscan stress test in February 2022. At the time of that stress test there was mild reversible ischemia in a larger the circumflex and a right coronary with an EF of 43%. She underwent cardiac cath and was found to have circumflex disease and was stented at that time by Dr. Olman Wheatley..  When she presented here in August she was without any angina. She could and continues to walk 1 mile with 1 break. At a half a mile she notes no overt claudication but a heaviness in her legs that is new compared to a year ago. She is chronically on Trental and a host of other medications including aspirin nitrates beta-blockers antihypertensives as well as statins and Effient and aspirin..  At the time of her last visit she presented presents for her final study of arterial Dopplers of her lower extremities but ABIs.  The studies were reviewed by Dr. Patricia who is planning to see her shortly.  Her symptoms will dictate management as her TAYLOR indexes are in the high 0.6 this and she is relatively stable with her normal walking schedule.  They had been done subsequently and she presents today to review these in conjunction with her other studies as well as a brief follow-up to see her response to the addition of diuretics for elevated pulmonary pressures.  She seems to be doing well, denies PND orthopnea, trace lower extremity edema related to previous vascular surgery, no PND orthopnea and her exercise tolerance is slightly improved since being on the diuretic with less dyspnea noted.

## 2025-07-22 NOTE — DISCUSSION/SUMMARY
[FreeTextEntry1] : Patient is a 78-year-old white female with advanced ischemic and peripheral vascular disease,, with recent studies revealed clinical stability.  She is remotely status post quadruple bypass at age 38 status post multiple interventions last being in 2022 to a  circumflex lesion who presents to the office today looking and feeling well, her edema has resolved with increasing her diuretic, her tachycardia arrhythmias are well-controlled on her current beta-blocker regimen and she has  no signs or symptoms of heart failure, has not gained weight and has no PND orthopnea and at this point we will reimplement diuretics on an alternate day basis.  Patient presented recently to review recent Zio patch as she was having episodes of SVT.  The frequency of the SVTs and severity regarding duration is significantly improved and she is tolerating higher dose metoprolol 37.5 twice daily.  Patient was reassured by the findings on her 3-day Zio patch, she was given the results of her vascular studies and informed that symptoms would dictate her management and that there was no limb threatening issues at this time.  Patient was advised to continue on current therapy as well as magnesium otherwise no changes made to her medical regimen and will be seeing Dr. Patricia in formal consultation.  On August 5 to review all of her vascular studies with him directly.  Joel Goldberg MD [facility]    [EKG obtained to assist in diagnosis and management of assessed problem(s)] : EKG obtained to assist in diagnosis and management of assessed problem(s)

## 2025-07-22 NOTE — REVIEW OF SYSTEMS
[Dyspnea on exertion] : dyspnea during exertion [Dizziness] : dizziness [Negative] : Heme/Lymph [Weight Gain (___ Lbs)] : no recent weight gain [SOB] : no shortness of breath [Chest Discomfort] : no chest discomfort [Lower Ext Edema] : no extremity edema [Leg Claudication] : no intermittent leg claudication [Palpitations] : no palpitations [Orthopnea] : no orthopnea [PND] : no PND [Syncope] : no syncope [Muscle Cramps] : muscle cramps [FreeTextEntry9] : Claudication but can ambulate a mile slowly [de-identified] : Near syncope

## 2025-07-22 NOTE — CARDIOLOGY SUMMARY
[de-identified] : (7/16/2025) EKG: NSR at 73 bpm with a frontal QRS axis of +15 degrees.  Remainder the tracing within normal limits.  (4/29/2025) EKG: NSR at 73 bpm with a frontal QRS axis of of 30 degrees.  Inferior MI indeterminate age, decreased R wave across the precordial leads cannot rule out a MI of indeterminate age.  Nonspecific ST-T changes  (12/12/2024) EKG: NSR at 74 beats minute with a frontal QRS axis +15 degrees, inferior MI indeterminate age, nonspecific IVCD otherwise normal trace  [de-identified] : (5/14/2025) 3-day Zio patch running from 5/1/2025 to 5/4/2025 revealed a minimum heart rate of 56, maximum of 145 average of 71.  Isolated 8 PVCs 20-39 less than 1% 16 couplets less than 1% 1 triplet less than 1% ventricular ectopy rare less than 1% 1980 [de-identified] : (7/16/2024) NUCLEAR STRESS TEST, 1. Myocardial Perfusion: Abnormal. 2. Stress electrocardiogram: No significant ischemic ST segment changes. 3. Qualitative Perfusion: - medium-sized, severe defect(s) in the inferolateral and lateral walls that are fixed, doesn't normalize with prone imaging suggestive of an infarct. 4. The post stress left ventricular EF is 70 %. 5. Akinesis of the inferolateral and lateral walls.   [de-identified] :  (11/26//2024) ECHOCARDIOGRAPHIC CONCLUSIONS 1. Technically difficult image quality. 2. Left ventricular systolic function is normal with an ejection fraction visually estimated at 65 %. 3. Normal left ventricular diastolic function. 4. Mild aortic stenosis. Peak velocity 2.17 m/s, mean gradient 8.6 mmHg, VTI ratio 0.62, VICKI 1.82 cm by continuity equation. Gradients may be underestimated due to TDS. Trace aortic regurgitation. 5. Mild mitral valve stenosis. 6. There is calcification of the mitral valve annulus. 7. Moderate mitral regurgitation. Normal pulmonary venous flow. 8. No tricuspid regurgitation. Pulmonary artery systolic pressure could not be estimated. 9. No pericardial effusion seen. 10. Compared to the transthoracic echocardiogram performed on 9/22/2023,.  [de-identified] :  (11/26/2024) ANKLE BRACHIAL INDEX CONCLUSIONS:  1. Right: Mildly abnormal TAYLOR 0.73 and ankle waveforms. Slight decrease post exercise. TBI is moderately reduced 0.36. 2. Left: Mildly abnormal TAYLOR 0.73 and ankle waveforms. Slight decrease post exercise. TBI is moderately reduced 0.43. 3. Compared to the TAYLOR report performed on 12/5/2023, decrease in left TAYLOR and bilateral TBI. 4. Consider lower arterial ultrasound for further characterization if clinically indicated.   (11/26/2024) CAROTID DUPLEX CONCLUSIONS:  1. Moderate heterogeneous and calcified non-obstructive atherosclerosis seen bilaterally. 2. Right mid ICA segment is very tortuous. 3. Left Vertebral Artery: antegrade flow. elevated PSV noted (133.40 cm/s). 4. Vertebral arteries: antegrade flow bilaterally. 5. Right: proximal ECA elevated velocity suggestive of narowing. 6. Compared to the carotid report performed on 9/22/2023, elevated left vert PSV, increased disease noted.

## 2025-07-22 NOTE — PHYSICAL EXAM
[Normal] : normal conjunctiva [Carotid Bruit] : carotid bruit [Normal S1, S2] : normal S1, S2 [No Gallop] : no gallop [Murmur] : murmur [Diminished Pedal Pulses ___] : diminished pedal pulses [unfilled] [de-identified] : Bilateral right greater than left [de-identified] : Grade 1/6 to 2/6 apical MR murmur [de-identified] : 1-2+ left lower extremity trace right lower extremity edema

## 2025-07-22 NOTE — HISTORY OF PRESENT ILLNESS
[FreeTextEntry1] : Patient is a 78-year-old white female with advanced ASCVD, peripheral vascular disease and status post coronary bypass surgery and multiple interventions for revascularization who well-known to me since age 38 and was seen initially on August 3 2023 in the office here to establish her ongoing cardiac care here and presents to the office several weeks ago for a brief follow-up, blood pressure check and as well as to review a battery of cardiac and vascular testing scheduled at the time of her last visit.   Before today's testing she had a Lexiscan study performed July 2024 that revealed a preserved ejection fraction and no evidence of active ischemia.  She underwent echocardiography on September 22 which is outlined below in cardiac summary and noted an elevated pulmonary pressure to 49 not previously recorded as that high.  Patient's prior cardiac history is notable for presenting at age 36 with new onset angina ,diagnostic cardiac cath was notable for left main disease and she underwent a quadruple bypass at age 38 with Dr. Justin Good at [facility].. She has been followed since that time with stress testing on a regular basis she unfortunately had a premorbid history of smoking 3 packs/day accounting for her aggressive vascular disease at a young age.. Patient has had other significant medical and cardiovascular issues she developed progressive claudication and underwent right popliteal stenosis repair with Dr. Corbett at [facility] for severe SFA stenosis. She has had vascular work done by Dr. Echols at [place] for recurrent and progressive symptomatic peripheral vascular disease. She is status post abdominal hernia repair with Dr. Webb within the last 2 years and is status post a right total hip replacement April 21 with Dr. Gordon Garces.   She is status post recent PTCA and stenting to circumflex lesion that was found on routine Lexiscan stress test in February 2022. At the time of that stress test there was mild reversible ischemia in a larger the circumflex and a right coronary with an EF of 43%. She underwent cardiac cath and was found to have circumflex disease and was stented at that time by Dr. Olman Wheatley..  When she presented here in August she was without any angina. She could and continues to walk 1 mile with 1 break. At a half a mile she notes no overt claudication but a heaviness in her legs that is new compared to a year ago. She is chronically on Trental and a host of other medications including aspirin nitrates beta-blockers antihypertensives as well as statins and Effient and aspirin..  At the time of her last visit she presented presents for her final study of arterial Dopplers of her lower extremities but ABIs.  The studies were reviewed by Dr. Patricia who is planning to see her shortly.  Her symptoms will dictate management as her TAYLOR indexes are in the high 0.6 this and she is relatively stable with her normal walking schedule.  They had been done subsequently and she presents today to review these in conjunction with her other studies as well as a brief follow-up to see her response to the addition of diuretics for elevated pulmonary pressures.  She seems to be doing well, denies PND orthopnea, trace lower extremity edema related to previous vascular surgery, no PND orthopnea and her exercise tolerance is slightly improved since being on the diuretic with less dyspnea noted.

## 2025-07-22 NOTE — CARDIOLOGY SUMMARY
[de-identified] : (7/16/2025) EKG: NSR at 73 bpm with a frontal QRS axis of +15 degrees.  Remainder the tracing within normal limits.  (4/29/2025) EKG: NSR at 73 bpm with a frontal QRS axis of of 30 degrees.  Inferior MI indeterminate age, decreased R wave across the precordial leads cannot rule out a MI of indeterminate age.  Nonspecific ST-T changes  (12/12/2024) EKG: NSR at 74 beats minute with a frontal QRS axis +15 degrees, inferior MI indeterminate age, nonspecific IVCD otherwise normal trace  [de-identified] : (5/14/2025) 3-day Zio patch running from 5/1/2025 to 5/4/2025 revealed a minimum heart rate of 56, maximum of 145 average of 71.  Isolated 8 PVCs 20-39 less than 1% 16 couplets less than 1% 1 triplet less than 1% ventricular ectopy rare less than 1% 1980 [de-identified] : (7/16/2024) NUCLEAR STRESS TEST, 1. Myocardial Perfusion: Abnormal. 2. Stress electrocardiogram: No significant ischemic ST segment changes. 3. Qualitative Perfusion: - medium-sized, severe defect(s) in the inferolateral and lateral walls that are fixed, doesn't normalize with prone imaging suggestive of an infarct. 4. The post stress left ventricular EF is 70 %. 5. Akinesis of the inferolateral and lateral walls.   [de-identified] :  (11/26//2024) ECHOCARDIOGRAPHIC CONCLUSIONS 1. Technically difficult image quality. 2. Left ventricular systolic function is normal with an ejection fraction visually estimated at 65 %. 3. Normal left ventricular diastolic function. 4. Mild aortic stenosis. Peak velocity 2.17 m/s, mean gradient 8.6 mmHg, VTI ratio 0.62, VICKI 1.82 cm by continuity equation. Gradients may be underestimated due to TDS. Trace aortic regurgitation. 5. Mild mitral valve stenosis. 6. There is calcification of the mitral valve annulus. 7. Moderate mitral regurgitation. Normal pulmonary venous flow. 8. No tricuspid regurgitation. Pulmonary artery systolic pressure could not be estimated. 9. No pericardial effusion seen. 10. Compared to the transthoracic echocardiogram performed on 9/22/2023,.  [de-identified] :  (11/26/2024) ANKLE BRACHIAL INDEX CONCLUSIONS:  1. Right: Mildly abnormal TAYLOR 0.73 and ankle waveforms. Slight decrease post exercise. TBI is moderately reduced 0.36. 2. Left: Mildly abnormal TAYLOR 0.73 and ankle waveforms. Slight decrease post exercise. TBI is moderately reduced 0.43. 3. Compared to the TAYLOR report performed on 12/5/2023, decrease in left TAYLOR and bilateral TBI. 4. Consider lower arterial ultrasound for further characterization if clinically indicated.   (11/26/2024) CAROTID DUPLEX CONCLUSIONS:  1. Moderate heterogeneous and calcified non-obstructive atherosclerosis seen bilaterally. 2. Right mid ICA segment is very tortuous. 3. Left Vertebral Artery: antegrade flow. elevated PSV noted (133.40 cm/s). 4. Vertebral arteries: antegrade flow bilaterally. 5. Right: proximal ECA elevated velocity suggestive of narowing. 6. Compared to the carotid report performed on 9/22/2023, elevated left vert PSV, increased disease noted.

## 2025-07-22 NOTE — REVIEW OF SYSTEMS
[Dyspnea on exertion] : dyspnea during exertion [Dizziness] : dizziness [Negative] : Heme/Lymph [Weight Gain (___ Lbs)] : no recent weight gain [SOB] : no shortness of breath [Chest Discomfort] : no chest discomfort [Lower Ext Edema] : no extremity edema [Leg Claudication] : no intermittent leg claudication [Palpitations] : no palpitations [Orthopnea] : no orthopnea [PND] : no PND [Syncope] : no syncope [Muscle Cramps] : muscle cramps [FreeTextEntry9] : Claudication but can ambulate a mile slowly [de-identified] : Near syncope